# Patient Record
Sex: MALE | Race: WHITE | NOT HISPANIC OR LATINO
[De-identification: names, ages, dates, MRNs, and addresses within clinical notes are randomized per-mention and may not be internally consistent; named-entity substitution may affect disease eponyms.]

---

## 2017-04-04 ENCOUNTER — RX RENEWAL (OUTPATIENT)
Age: 82
End: 2017-04-04

## 2018-11-26 ENCOUNTER — INPATIENT (INPATIENT)
Facility: HOSPITAL | Age: 83
LOS: 2 days | Discharge: NURSING, REHAB OR LONG-TERM CARE | DRG: 640 | End: 2018-11-29
Attending: INTERNAL MEDICINE | Admitting: INTERNAL MEDICINE
Payer: MEDICARE

## 2018-11-26 VITALS
HEART RATE: 85 BPM | DIASTOLIC BLOOD PRESSURE: 85 MMHG | SYSTOLIC BLOOD PRESSURE: 158 MMHG | OXYGEN SATURATION: 96 % | RESPIRATION RATE: 18 BRPM | TEMPERATURE: 98 F

## 2018-11-26 DIAGNOSIS — R41.89 OTHER SYMPTOMS AND SIGNS INVOLVING COGNITIVE FUNCTIONS AND AWARENESS: ICD-10-CM

## 2018-11-26 DIAGNOSIS — Z95.1 PRESENCE OF AORTOCORONARY BYPASS GRAFT: Chronic | ICD-10-CM

## 2018-11-26 DIAGNOSIS — F03.90 UNSPECIFIED DEMENTIA WITHOUT BEHAVIORAL DISTURBANCE: ICD-10-CM

## 2018-11-26 DIAGNOSIS — I25.10 ATHEROSCLEROTIC HEART DISEASE OF NATIVE CORONARY ARTERY WITHOUT ANGINA PECTORIS: ICD-10-CM

## 2018-11-26 LAB
ALBUMIN SERPL ELPH-MCNC: 4.2 G/DL — SIGNIFICANT CHANGE UP (ref 3.3–5)
ALP SERPL-CCNC: 83 U/L — SIGNIFICANT CHANGE UP (ref 40–120)
ALT FLD-CCNC: 64 U/L — HIGH (ref 10–45)
ANION GAP SERPL CALC-SCNC: 21 MMOL/L — HIGH (ref 5–17)
APPEARANCE UR: CLEAR — SIGNIFICANT CHANGE UP
AST SERPL-CCNC: 209 U/L — HIGH (ref 10–40)
BACTERIA # UR AUTO: PRESENT /HPF
BASOPHILS NFR BLD AUTO: 0.1 % — SIGNIFICANT CHANGE UP (ref 0–2)
BILIRUB SERPL-MCNC: 1.1 MG/DL — SIGNIFICANT CHANGE UP (ref 0.2–1.2)
BILIRUB UR-MCNC: ABNORMAL
BUN SERPL-MCNC: 31 MG/DL — HIGH (ref 7–23)
CALCIUM SERPL-MCNC: 10.1 MG/DL — SIGNIFICANT CHANGE UP (ref 8.4–10.5)
CHLORIDE SERPL-SCNC: 99 MMOL/L — SIGNIFICANT CHANGE UP (ref 96–108)
CK MB CFR SERPL CALC: 32.2 NG/ML — HIGH (ref 0–6.7)
CK SERPL-CCNC: 3661 U/L — HIGH (ref 30–200)
CO2 SERPL-SCNC: 24 MMOL/L — SIGNIFICANT CHANGE UP (ref 22–31)
COLOR SPEC: YELLOW — SIGNIFICANT CHANGE UP
CREAT SERPL-MCNC: 0.8 MG/DL — SIGNIFICANT CHANGE UP (ref 0.5–1.3)
DIFF PNL FLD: ABNORMAL
EOSINOPHIL NFR BLD AUTO: 0.1 % — SIGNIFICANT CHANGE UP (ref 0–6)
EPI CELLS # UR: ABNORMAL /HPF (ref 0–5)
GLUCOSE SERPL-MCNC: 131 MG/DL — HIGH (ref 70–99)
GLUCOSE UR QL: NEGATIVE — SIGNIFICANT CHANGE UP
GRAN CASTS # UR COMP ASSIST: ABNORMAL /LPF
HCT VFR BLD CALC: 48.7 % — SIGNIFICANT CHANGE UP (ref 39–50)
HGB BLD-MCNC: 15.9 G/DL — SIGNIFICANT CHANGE UP (ref 13–17)
KETONES UR-MCNC: 40 MG/DL
LEUKOCYTE ESTERASE UR-ACNC: NEGATIVE — SIGNIFICANT CHANGE UP
LYMPHOCYTES # BLD AUTO: 5.9 % — LOW (ref 13–44)
MCHC RBC-ENTMCNC: 27.5 PG — SIGNIFICANT CHANGE UP (ref 27–34)
MCHC RBC-ENTMCNC: 32.6 G/DL — SIGNIFICANT CHANGE UP (ref 32–36)
MCV RBC AUTO: 84.3 FL — SIGNIFICANT CHANGE UP (ref 80–100)
MONOCYTES NFR BLD AUTO: 9.5 % — SIGNIFICANT CHANGE UP (ref 2–14)
NEUTROPHILS NFR BLD AUTO: 84.4 % — HIGH (ref 43–77)
NITRITE UR-MCNC: NEGATIVE — SIGNIFICANT CHANGE UP
PH UR: 6 — SIGNIFICANT CHANGE UP (ref 5–8)
PLATELET # BLD AUTO: 275 K/UL — SIGNIFICANT CHANGE UP (ref 150–400)
POTASSIUM SERPL-MCNC: 4.3 MMOL/L — SIGNIFICANT CHANGE UP (ref 3.5–5.3)
POTASSIUM SERPL-SCNC: 4.3 MMOL/L — SIGNIFICANT CHANGE UP (ref 3.5–5.3)
PROT SERPL-MCNC: 8.5 G/DL — HIGH (ref 6–8.3)
PROT UR-MCNC: 100 MG/DL
RBC # BLD: 5.78 M/UL — SIGNIFICANT CHANGE UP (ref 4.2–5.8)
RBC # FLD: 13.3 % — SIGNIFICANT CHANGE UP (ref 10.3–16.9)
RBC CASTS # UR COMP ASSIST: ABNORMAL /HPF
SODIUM SERPL-SCNC: 144 MMOL/L — SIGNIFICANT CHANGE UP (ref 135–145)
SP GR SPEC: >=1.03 — SIGNIFICANT CHANGE UP (ref 1–1.03)
TROPONIN T SERPL-MCNC: 0.02 NG/ML — HIGH (ref 0–0.01)
UROBILINOGEN FLD QL: 0.2 E.U./DL — SIGNIFICANT CHANGE UP
WBC # BLD: 15.5 K/UL — HIGH (ref 3.8–10.5)
WBC # FLD AUTO: 15.5 K/UL — HIGH (ref 3.8–10.5)
WBC UR QL: ABNORMAL /HPF

## 2018-11-26 PROCEDURE — 99285 EMERGENCY DEPT VISIT HI MDM: CPT | Mod: 25

## 2018-11-26 PROCEDURE — 93010 ELECTROCARDIOGRAM REPORT: CPT

## 2018-11-26 PROCEDURE — 73522 X-RAY EXAM HIPS BI 3-4 VIEWS: CPT | Mod: 26

## 2018-11-26 PROCEDURE — 71045 X-RAY EXAM CHEST 1 VIEW: CPT | Mod: 26

## 2018-11-26 PROCEDURE — 70450 CT HEAD/BRAIN W/O DYE: CPT | Mod: 26

## 2018-11-26 PROCEDURE — 72125 CT NECK SPINE W/O DYE: CPT | Mod: 26

## 2018-11-26 RX ORDER — SODIUM CHLORIDE 9 MG/ML
1000 INJECTION INTRAMUSCULAR; INTRAVENOUS; SUBCUTANEOUS ONCE
Qty: 0 | Refills: 0 | Status: COMPLETED | OUTPATIENT
Start: 2018-11-26 | End: 2018-11-26

## 2018-11-26 RX ORDER — HEPARIN SODIUM 5000 [USP'U]/ML
5000 INJECTION INTRAVENOUS; SUBCUTANEOUS EVERY 8 HOURS
Qty: 0 | Refills: 0 | Status: DISCONTINUED | OUTPATIENT
Start: 2018-11-26 | End: 2018-11-29

## 2018-11-26 RX ADMIN — SODIUM CHLORIDE 2000 MILLILITER(S): 9 INJECTION INTRAMUSCULAR; INTRAVENOUS; SUBCUTANEOUS at 15:04

## 2018-11-26 RX ADMIN — SODIUM CHLORIDE 200 MILLILITER(S): 9 INJECTION INTRAMUSCULAR; INTRAVENOUS; SUBCUTANEOUS at 20:30

## 2018-11-26 RX ADMIN — SODIUM CHLORIDE 1000 MILLILITER(S): 9 INJECTION INTRAMUSCULAR; INTRAVENOUS; SUBCUTANEOUS at 16:16

## 2018-11-26 RX ADMIN — Medication 1 MILLIGRAM(S): at 18:10

## 2018-11-26 RX ADMIN — SODIUM CHLORIDE 2000 MILLILITER(S): 9 INJECTION INTRAMUSCULAR; INTRAVENOUS; SUBCUTANEOUS at 16:38

## 2018-11-26 NOTE — ED ADULT TRIAGE NOTE - CHIEF COMPLAINT QUOTE
as per son "we haven't seen him since Friday night and today I went over there and found him on the floor."

## 2018-11-26 NOTE — H&P ADULT - PROBLEM SELECTOR PLAN 2
Trop 0.02, CK 3666  Received IV hydration  will repeat in AM  Possible CABG per his son, will obtain collateral from Dr Erica Espitia   Will also obtain list of medications

## 2018-11-26 NOTE — H&P ADULT - PMH
Bronchitis    CAD (coronary artery disease)    Dementia    Depression    Diverticulitis    Falls frequently    Hypertension    Osteopenia    Prostate cancer

## 2018-11-26 NOTE — ED ADULT NURSE NOTE - NSIMPLEMENTINTERV_GEN_ALL_ED
Implemented All Fall with Harm Risk Interventions:  [place] to call system. Call bell, personal items and telephone within reach. Instruct patient to call for assistance. Room bathroom lighting operational. Non-slip footwear when patient is off stretcher. Physically safe environment: no spills, clutter or unnecessary equipment. Stretcher in lowest position, wheels locked, appropriate side rails in place. Provide visual cue, wrist band, yellow gown, etc. Monitor gait and stability. Monitor for mental status changes and reorient to person, place, and time. Review medications for side effects contributing to fall risk. Reinforce activity limits and safety measures with patient and family. Provide visual clues: red socks.

## 2018-11-26 NOTE — H&P ADULT - NSHPLABSRESULTS_GEN_ALL_CORE
15.9   15.5  )-----------( 275      ( 26 Nov 2018 15:07 )             48.7       11-26    144  |  99  |  31<H>  ----------------------------<  131<H>  4.3   |  24  |  0.80    Ca    10.1      26 Nov 2018 15:07    TPro  8.5<H>  /  Alb  4.2  /  TBili  1.1  /  DBili  x   /  AST  209<H>  /  ALT  64<H>  /  AlkPhos  83  11-26          CARDIAC MARKERS ( 26 Nov 2018 15:07 )  x     / 0.02 ng/mL / 3661 U/L / x     / 32.2 ng/mL        Urinalysis Basic - ( 26 Nov 2018 16:50 )    Color: Yellow / Appearance: Clear / SG: >=1.030 / pH: x  Gluc: x / Ketone: 40 mg/dL  / Bili: Small / Urobili: 0.2 E.U./dL   Blood: x / Protein: 100 mg/dL / Nitrite: NEGATIVE   Leuk Esterase: NEGATIVE / RBC: 5-10 /HPF / WBC 5-10 /HPF   Sq Epi: x / Non Sq Epi: 5-10 /HPF / Bacteria: Present /HPF

## 2018-11-26 NOTE — H&P ADULT - NSHPPHYSICALEXAM_GEN_ALL_CORE
Temp 98.6F, HR 70bpm, /80, RR 16, O2 sat 98% RA, Weight 200lbs, Height 5' 6"    T(C): 36.4 (11-26-18 @ 22:33), Max: 36.7 (11-26-18 @ 15:39)  HR: 78 (11-26-18 @ 22:33) (78 - 95)  BP: 149/73 (11-26-18 @ 22:33) (146/82 - 176/87)  RR: 18 (11-26-18 @ 22:33) (18 - 18)  SpO2: 99% (11-26-18 @ 22:33) (96% - 99%)  Wt(kg): --    Appearance: Normal	  HEENT:   Normal oral mucosa, PERRL, EOMI	  Neck: Supple, - JVD; No Carotid Bruit and 2+ pulses B/L  Cardiovascular: Normal S1 S2, No JVD, No murmurs  Respiratory: Lungs clear to auscultation anteriorly	  Gastrointestinal:  Soft, Non-tender, + BS	  Skin: No rashes, No ecchymoses, No cyanosis  Extremities:  No clubbing, cyanosis or edema  Vascular: DP 1+ b/l, PT 1+ b/l  Neurologic: Non-focal  Psychiatry: Awake but no oriented

## 2018-11-26 NOTE — H&P ADULT - ASSESSMENT
85 y/o male with HTN, dementia, found on the floor at home from unknown length of time, dehydrated and confused, requiring sternal rub by EMS. CT head, chest unrevealing, CKMB 3666, troponin 0.02, WBC 15.5 but afebrile. Hr received 3L IVF and admitted

## 2018-11-26 NOTE — H&P ADULT - PROBLEM SELECTOR PLAN 1
Fall vs Syncope  Telemetry  PT to evaluate  Speech and swallow evaluation  Echocardiogram to evaluate wall motion

## 2018-11-26 NOTE — H&P ADULT - HISTORY OF PRESENT ILLNESS
This is a 86 y.o male with HTN, prostate cancer treated with seed implant, CAD ? CABG, GERD, diverticulitis, bronchitis, dementia on Donepezil This is a 86 y.o male with HTN, prostate cancer treated with seed implant, CAD ? CABG, GERD, diverticulitis, bronchitis, dementia on Donepezil. Pt lives alone, has multiples falls in the past year, BIBAwith AMS after fall vs syncope. Pt last seen functioning at baseline on Friday evening by son; On day of admission, son found pt on the floor next to bed and dresser, + AMS unclear for how long pt was on the floor, unclear precipitating events. Pt required sternal rub by EMS and in the ER for response. In the ER, he was found dehydrated, CKMB 3666, Troponin 0.02, elevated LFTs, BUN 31, WBC 15.5, afebrile, VSS. CT Head and CT spine negative for infarct/hemorrhage/fracture; CXR negative, Hip XRay negative. He received 3 Liters NS and became somewhat more alert then ripped off his IV line, became agitated, requiring Ativan 1mg and soft restraints to all 4 limbs  He is admitted for observation and possible safe discharge

## 2018-11-26 NOTE — ED PROVIDER NOTE - PROGRESS NOTE DETAILS
Pt hydrated in ED and awakened and agitated. Does not want to stay and be admitted. Ripped IV out. Confused and unsure about circumstances that brought him to the ED. Pt does not have capacity to make decisions and Ativan given for agitation and soft restraints applied. Labs noted. Ongoing plan of care discussed at length with family at bedside.

## 2018-11-26 NOTE — ED PROVIDER NOTE - MEDICAL DECISION MAKING DETAILS
87 yo M with hx of HTN, prostate ca, CAD, "pre-diabetes", bronchitis, GERD, diverticulitis, bronchitis, osteopenia, depression, dementia p/w BIBA with AMS after fall vs syncope. Pt last seen functioning at baseline on Friday evening by son; On day of admission, son found pt on the floor next to bed and dresser, + AMS unclear for how long pt was on the floor, unclear precipitating events. Pt responding only to sternal rub in ED. Son and family at bedside and state that pt with multiple falls in the past year and has declined home health aides and any help at home. Pt hydrated in ED and awakened and agitated. Does not want to stay and be admitted. Ripped IV out. Confused and unsure about circumstances that brought him to the ED. Pt does not have capacity to make decisions and Ativan given for agitation and soft restraints applied. Labs noted. Ongoing plan of care discussed at length with family at bedside. CK elevated and trop mildly elevated. CT Head/ c-spine with NAD. EKG noted. Pt admitted to cardiology for tele monitoring for possible syncope.

## 2018-11-26 NOTE — ED ADULT NURSE REASSESSMENT NOTE - NS ED NURSE REASSESS COMMENT FT1
pt became agitated & pulled IV out. Dr. Phillips made aware. soft restraints were applied to pt, pt's family made aware of plan of care. safety maintained. will continue to monitor.

## 2018-11-26 NOTE — H&P ADULT - PROBLEM SELECTOR PLAN 3
On Aricept at home  Required Ativan for agitation  Continue soft restraint for now  Psych consult in AM

## 2018-11-26 NOTE — ED ADULT NURSE NOTE - OBJECTIVE STATEMENT
Pt is a 86y/ male BIBA w/ c/o AMS. As per son, last time pt was seen was on Friday, today he found him lying on floor unresponsive. AAOx0 upon assessment, pt responded to vigorous stimuli. skin dry, flaky, redness blister-like wound noted to sacrum, also bruises noted to right arm, hip, and back. Pus/drainage noted around eyelids bilaterally. Attached to cardiac monitor.  As per son, pt is in early stages of dementia, recently lost his wife.

## 2018-11-26 NOTE — ED PROVIDER NOTE - OBJECTIVE STATEMENT
85 yo M with hx of HTN, prostate ca, CAD, "pre-diabetes", bronchitis, GERD, diverticulitis, bronchitis, osteopenia, depression, dementia p/w 85 yo M with hx of HTN, prostate ca, CAD, "pre-diabetes", bronchitis, GERD, diverticulitis, bronchitis, osteopenia, depression, dementia p/w BIBA with AMS after fall vs syncope. Pt last seen functioning at baseline on Friday evening by son; On day of admission, son found pt on the floor next to bed and dresser, + AMS unclear for how long pt was on the floor, unclear precipitating events. Pt responding only to sternal rub in ED. Son and family at bedside and state that pt with multiple falls in the past year and has declined home health aides and any help at home.

## 2018-11-27 DIAGNOSIS — R41.0 DISORIENTATION, UNSPECIFIED: ICD-10-CM

## 2018-11-27 LAB
ALBUMIN SERPL ELPH-MCNC: 3.4 G/DL — SIGNIFICANT CHANGE UP (ref 3.3–5)
ALP SERPL-CCNC: 66 U/L — SIGNIFICANT CHANGE UP (ref 40–120)
ALT FLD-CCNC: 51 U/L — HIGH (ref 10–45)
ANION GAP SERPL CALC-SCNC: 16 MMOL/L — SIGNIFICANT CHANGE UP (ref 5–17)
AST SERPL-CCNC: 123 U/L — HIGH (ref 10–40)
BILIRUB DIRECT SERPL-MCNC: <0.2 MG/DL — SIGNIFICANT CHANGE UP (ref 0–0.2)
BILIRUB INDIRECT FLD-MCNC: >0.7 MG/DL — SIGNIFICANT CHANGE UP (ref 0.2–1)
BILIRUB SERPL-MCNC: 0.9 MG/DL — SIGNIFICANT CHANGE UP (ref 0.2–1.2)
BUN SERPL-MCNC: 29 MG/DL — HIGH (ref 7–23)
CALCIUM SERPL-MCNC: 8.9 MG/DL — SIGNIFICANT CHANGE UP (ref 8.4–10.5)
CHLORIDE SERPL-SCNC: 106 MMOL/L — SIGNIFICANT CHANGE UP (ref 96–108)
CO2 SERPL-SCNC: 24 MMOL/L — SIGNIFICANT CHANGE UP (ref 22–31)
CREAT SERPL-MCNC: 0.69 MG/DL — SIGNIFICANT CHANGE UP (ref 0.5–1.3)
CULTURE RESULTS: NO GROWTH — SIGNIFICANT CHANGE UP
GLUCOSE SERPL-MCNC: 102 MG/DL — HIGH (ref 70–99)
HCT VFR BLD CALC: 44.4 % — SIGNIFICANT CHANGE UP (ref 39–50)
HCT VFR BLD CALC: 46.2 % — SIGNIFICANT CHANGE UP (ref 39–50)
HGB BLD-MCNC: 14.2 G/DL — SIGNIFICANT CHANGE UP (ref 13–17)
HGB BLD-MCNC: 14.7 G/DL — SIGNIFICANT CHANGE UP (ref 13–17)
MAGNESIUM SERPL-MCNC: 2.1 MG/DL — SIGNIFICANT CHANGE UP (ref 1.6–2.6)
MCHC RBC-ENTMCNC: 27.7 PG — SIGNIFICANT CHANGE UP (ref 27–34)
MCHC RBC-ENTMCNC: 28 PG — SIGNIFICANT CHANGE UP (ref 27–34)
MCHC RBC-ENTMCNC: 31.8 G/DL — LOW (ref 32–36)
MCHC RBC-ENTMCNC: 32 G/DL — SIGNIFICANT CHANGE UP (ref 32–36)
MCV RBC AUTO: 87.2 FL — SIGNIFICANT CHANGE UP (ref 80–100)
MCV RBC AUTO: 87.4 FL — SIGNIFICANT CHANGE UP (ref 80–100)
PLATELET # BLD AUTO: 210 K/UL — SIGNIFICANT CHANGE UP (ref 150–400)
PLATELET # BLD AUTO: 218 K/UL — SIGNIFICANT CHANGE UP (ref 150–400)
POTASSIUM SERPL-MCNC: 3.4 MMOL/L — LOW (ref 3.5–5.3)
POTASSIUM SERPL-SCNC: 3.4 MMOL/L — LOW (ref 3.5–5.3)
PROT SERPL-MCNC: 6.6 G/DL — SIGNIFICANT CHANGE UP (ref 6–8.3)
RBC # BLD: 5.08 M/UL — SIGNIFICANT CHANGE UP (ref 4.2–5.8)
RBC # BLD: 5.3 M/UL — SIGNIFICANT CHANGE UP (ref 4.2–5.8)
RBC # FLD: 13.4 % — SIGNIFICANT CHANGE UP (ref 10.3–16.9)
RBC # FLD: 13.4 % — SIGNIFICANT CHANGE UP (ref 10.3–16.9)
SODIUM SERPL-SCNC: 146 MMOL/L — HIGH (ref 135–145)
SPECIMEN SOURCE: SIGNIFICANT CHANGE UP
WBC # BLD: 11.2 K/UL — HIGH (ref 3.8–10.5)
WBC # BLD: 11.4 K/UL — HIGH (ref 3.8–10.5)
WBC # FLD AUTO: 11.2 K/UL — HIGH (ref 3.8–10.5)
WBC # FLD AUTO: 11.4 K/UL — HIGH (ref 3.8–10.5)

## 2018-11-27 PROCEDURE — 93306 TTE W/DOPPLER COMPLETE: CPT | Mod: 26

## 2018-11-27 PROCEDURE — 99223 1ST HOSP IP/OBS HIGH 75: CPT

## 2018-11-27 PROCEDURE — 74018 RADEX ABDOMEN 1 VIEW: CPT | Mod: 26

## 2018-11-27 PROCEDURE — 99233 SBSQ HOSP IP/OBS HIGH 50: CPT

## 2018-11-27 RX ORDER — CITALOPRAM 10 MG/1
10 TABLET, FILM COATED ORAL DAILY
Qty: 0 | Refills: 0 | Status: DISCONTINUED | OUTPATIENT
Start: 2018-11-27 | End: 2018-11-27

## 2018-11-27 RX ORDER — CITALOPRAM 10 MG/1
40 TABLET, FILM COATED ORAL DAILY
Qty: 0 | Refills: 0 | Status: DISCONTINUED | OUTPATIENT
Start: 2018-11-27 | End: 2018-11-29

## 2018-11-27 RX ORDER — ONDANSETRON 8 MG/1
4 TABLET, FILM COATED ORAL ONCE
Qty: 0 | Refills: 0 | Status: COMPLETED | OUTPATIENT
Start: 2018-11-27 | End: 2018-11-27

## 2018-11-27 RX ORDER — ATORVASTATIN CALCIUM 80 MG/1
40 TABLET, FILM COATED ORAL AT BEDTIME
Qty: 0 | Refills: 0 | Status: DISCONTINUED | OUTPATIENT
Start: 2018-11-27 | End: 2018-11-29

## 2018-11-27 RX ORDER — LANOLIN ALCOHOL/MO/W.PET/CERES
3 CREAM (GRAM) TOPICAL AT BEDTIME
Qty: 0 | Refills: 0 | Status: DISCONTINUED | OUTPATIENT
Start: 2018-11-27 | End: 2018-11-29

## 2018-11-27 RX ORDER — DONEPEZIL HYDROCHLORIDE 10 MG/1
10 TABLET, FILM COATED ORAL AT BEDTIME
Qty: 0 | Refills: 0 | Status: DISCONTINUED | OUTPATIENT
Start: 2018-11-27 | End: 2018-11-29

## 2018-11-27 RX ORDER — PANTOPRAZOLE SODIUM 20 MG/1
40 TABLET, DELAYED RELEASE ORAL
Qty: 0 | Refills: 0 | Status: DISCONTINUED | OUTPATIENT
Start: 2018-11-27 | End: 2018-11-28

## 2018-11-27 RX ORDER — POTASSIUM CHLORIDE 20 MEQ
40 PACKET (EA) ORAL ONCE
Qty: 0 | Refills: 0 | Status: COMPLETED | OUTPATIENT
Start: 2018-11-27 | End: 2018-11-27

## 2018-11-27 RX ORDER — SODIUM CHLORIDE 9 MG/ML
1000 INJECTION INTRAMUSCULAR; INTRAVENOUS; SUBCUTANEOUS
Qty: 0 | Refills: 0 | Status: DISCONTINUED | OUTPATIENT
Start: 2018-11-27 | End: 2018-11-28

## 2018-11-27 RX ORDER — ASPIRIN/CALCIUM CARB/MAGNESIUM 324 MG
81 TABLET ORAL DAILY
Qty: 0 | Refills: 0 | Status: DISCONTINUED | OUTPATIENT
Start: 2018-11-27 | End: 2018-11-29

## 2018-11-27 RX ORDER — PANTOPRAZOLE SODIUM 20 MG/1
40 TABLET, DELAYED RELEASE ORAL ONCE
Qty: 0 | Refills: 0 | Status: COMPLETED | OUTPATIENT
Start: 2018-11-27 | End: 2018-11-27

## 2018-11-27 RX ADMIN — ONDANSETRON 4 MILLIGRAM(S): 8 TABLET, FILM COATED ORAL at 21:30

## 2018-11-27 RX ADMIN — PANTOPRAZOLE SODIUM 40 MILLIGRAM(S): 20 TABLET, DELAYED RELEASE ORAL at 20:41

## 2018-11-27 RX ADMIN — Medication 3 MILLIGRAM(S): at 23:59

## 2018-11-27 RX ADMIN — SODIUM CHLORIDE 100 MILLILITER(S): 9 INJECTION INTRAMUSCULAR; INTRAVENOUS; SUBCUTANEOUS at 20:06

## 2018-11-27 RX ADMIN — DONEPEZIL HYDROCHLORIDE 10 MILLIGRAM(S): 10 TABLET, FILM COATED ORAL at 23:59

## 2018-11-27 RX ADMIN — CITALOPRAM 10 MILLIGRAM(S): 10 TABLET, FILM COATED ORAL at 14:03

## 2018-11-27 RX ADMIN — ATORVASTATIN CALCIUM 40 MILLIGRAM(S): 80 TABLET, FILM COATED ORAL at 23:59

## 2018-11-27 RX ADMIN — ONDANSETRON 4 MILLIGRAM(S): 8 TABLET, FILM COATED ORAL at 19:39

## 2018-11-27 RX ADMIN — Medication 40 MILLIEQUIVALENT(S): at 09:02

## 2018-11-27 RX ADMIN — HEPARIN SODIUM 5000 UNIT(S): 5000 INJECTION INTRAVENOUS; SUBCUTANEOUS at 14:02

## 2018-11-27 RX ADMIN — HEPARIN SODIUM 5000 UNIT(S): 5000 INJECTION INTRAVENOUS; SUBCUTANEOUS at 23:58

## 2018-11-27 NOTE — BEHAVIORAL HEALTH ASSESSMENT NOTE - NSBHCHARTREVIEWVS_PSY_A_CORE FT
Vital Signs Last 24 Hrs  T(C): 36.7 (27 Nov 2018 10:25), Max: 36.8 (27 Nov 2018 05:30)  T(F): 98 (27 Nov 2018 10:25), Max: 98.2 (27 Nov 2018 05:30)  HR: 78 (27 Nov 2018 10:25) (78 - 95)  BP: 149/72 (27 Nov 2018 10:25) (146/82 - 176/87)  BP(mean): --  RR: 18 (27 Nov 2018 10:25) (18 - 18)  SpO2: 93% (27 Nov 2018 10:25) (93% - 99%)

## 2018-11-27 NOTE — PROGRESS NOTE ADULT - PROBLEM SELECTOR PLAN 1
Fall vs Syncope  -continue to monitor on Telemetry  -evaluated by PT recommending MILA  -CT head negative, CT spine negative, Hip Xray negative   -Speech and swallow evaluated recommending mechanical soft and thin liquids  -Echo 11/27 with concentric LVH, abnormal (paradoxical) septal wall motion c/w abnormal conduction, EF 50-55%, no significant valvular disease.  -Collateral from [facility] Cardiologist Dr. Ugarte: Echo 2015 @ [facility] with abnormal septal wall motion c/w post CABG, Echo in office 2/2018 unchanged from 2015 Echo

## 2018-11-27 NOTE — BEHAVIORAL HEALTH ASSESSMENT NOTE - NSBHADMITCOUNSEL_PSY_A_CORE
importance of adherence to chosen treatment/diagnostic results/impressions and/or recommended studies

## 2018-11-27 NOTE — PHYSICAL THERAPY INITIAL EVALUATION ADULT - PERTINENT HX OF CURRENT PROBLEM, REHAB EVAL
85 yo M as per chart [facility] AMS after fall vs syncope. Pt last seen functioning at baseline on Friday evening by son; On day of admission, son found pt on the floor next to bed and dresser, + AMS unclear for how long pt was on the floor, unclear precipitating events. Pt required sternal rub by EMS and in the ER for response.

## 2018-11-27 NOTE — SWALLOW BEDSIDE ASSESSMENT ADULT - ADDITIONAL RECOMMENDATIONS
This SVC will f/u to monitor diet tolerance and provide additional counseling re safe eating/feeding strategies.

## 2018-11-27 NOTE — SWALLOW BEDSIDE ASSESSMENT ADULT - COMMENTS
86 y.o male with HTN, prostate cancer treated with seed implant, CAD ? CABG, GERD, diverticulitis, bronchitis, dementia on Donepezil. Pt lives alone, has multiples falls in the past year, BIBAwith AMS after fall vs syncope. Pt last seen functioning at baseline on Friday evening by son; On day of admission, son found pt on the floor next to bed and dresser, + AMS unclear for how long pt was on the floor, unclear precipitating events. Pt required sternal rub by EMS and in the ER for response. In the ER, he was found dehydrated, CKMB 3666, Troponin 0.02, elevated LFTs, BUN 31, WBC 15.5, afebrile, VSS. CT Head and CT spine negative for infarct/hemorrhage/fracture; CXR negative, Hip XRay negative. He received 3 Liters NS and became somewhat more alert then ripped off his IV line, became agitated, requiring Ativan 1mg and soft restraints to all 4 limbs  He is admitted for observation and possible safe discharge.

## 2018-11-27 NOTE — SWALLOW BEDSIDE ASSESSMENT ADULT - ASR SWALLOW ASPIRATION MONITOR
change of breathing pattern/cough/gurgly voice/upper respiratory infection/fever/pneumonia/throat clearing

## 2018-11-27 NOTE — SWALLOW BEDSIDE ASSESSMENT ADULT - PHARYNGEAL PHASE
laryngeal elevation palpated. No overt s/s of airway protection deficits were observed. Inconsistent double swallows per bolus across consistencies.

## 2018-11-27 NOTE — BEHAVIORAL HEALTH ASSESSMENT NOTE - NSBHCHARTREVIEWLAB_PSY_A_CORE FT
CBC Full  -  ( 27 Nov 2018 06:01 )  WBC Count : 11.2 K/uL  Hemoglobin : 14.2 g/dL  Hematocrit : 44.4 %  Platelet Count - Automated : 210 K/uL  Mean Cell Volume : 87.4 fL  Mean Cell Hemoglobin : 28.0 pg  Mean Cell Hemoglobin Concentration : 32.0 g/dL  Auto Neutrophil # : x  Auto Lymphocyte # : x  Auto Monocyte # : x  Auto Eosinophil # : x  Auto Basophil # : x  Auto Neutrophil % : x  Auto Lymphocyte % : x  Auto Monocyte % : x  Auto Eosinophil % : x  Auto Basophil % : x  11-27    146<H>  |  106  |  29<H>  ----------------------------<  102<H>  3.4<L>   |  24  |  0.69    Ca    8.9      27 Nov 2018 06:01  Mg     2.1     11-27    TPro  6.6  /  Alb  3.4  /  TBili  0.9  /  DBili  <0.2  /  AST  123<H>  /  ALT  51<H>  /  AlkPhos  66  11-27

## 2018-11-27 NOTE — BEHAVIORAL HEALTH ASSESSMENT NOTE - NSBHCHARTREVIEWIMAGING_PSY_A_CORE FT
< from: CT Head No Cont (11.26.18 @ 16:29) >    NTERPRETATION:  PROCEDURE: CT Head without contrast.    INDICATION: Altered mental status. Status post fall    TECHNIQUE: Multiple axial sections were obtained at 5 mm intervals. The   images were reviewed in brain and bone windows. Sagittal and coronal   reformations are provided.    COMPARISON: None    FINDINGS: The study is mildly limited by patient motion. Grossly, there   is no acute intracranial hemorrhage, mass effect or midline shift.   Gray-white matter differentiation appears preserved without evidence of   recent transcortical infarction.    There is mild to moderate diffuse parenchymal volume loss with   proportionate sulcal enlargement and ventriculomegaly. No rounding of the   temporal horns to imply acute hydrocephalus.    Gray to white matter differentiation appears preserved without evidence   of recent transcortical infarction injury. There is mild heterogeneous   lucency throughout the cerebral white matter that is most consistent with   small vessel ischemic change in a patient of this age.    Bone window images show no calvarial fracture. There is right   parieto-occipital scalp swelling. The paranasal sinuses and mastoid air   cells appear well ventilated. No acute orbital finding.    IMPRESSION:     No acute intracranial hemorrhage or calvarial fracture, within the mild   motion limitations of the study. Right parieto-occipital scalp injury.      < end of copied text >

## 2018-11-27 NOTE — SWALLOW BEDSIDE ASSESSMENT ADULT - SWALLOW EVAL: DIAGNOSIS
Pt p/w mild oropharyngeal dysphagia, characterized by decreased efficiency of oral manipulation of the bolus resulting in prolonged mastication and lingual residue, as well as suspected decreased strength of swallow resulting in multiple swallows. However, no overt s/s of airway protection deficits were observed. Pt would benefit from starting a modified diet w strict aspiration precautions.

## 2018-11-27 NOTE — SWALLOW BEDSIDE ASSESSMENT ADULT - SLP GENERAL OBSERVATIONS
Pt was received alert in semi-reclined bed, drinking thin liq by straw. Pt's son also present at bedside. Pt was oriented to self, place and year; but not to month/day.

## 2018-11-27 NOTE — PHYSICAL THERAPY INITIAL EVALUATION ADULT - ADDITIONAL COMMENTS
Pt lives at home alone though unable to give more history regarding home step up, elevator/stair? access. PTA: pt is ambulatory PTA though unable to provide details assistance/DME?

## 2018-11-27 NOTE — PROVIDER CONTACT NOTE (OTHER) - ACTION/TREATMENT ORDERED:
Zofran 4mg IV given, Protonix 4omg IV Abd XRay done. Contd to monitor BP, GI to see pt. Zofran 4mg IV given, Protonix 4omg IV Abd XRay done. Contd to monitor BP, CBC GI to see pt.Pt aslo started on Ns at 100 cchr.M

## 2018-11-27 NOTE — SWALLOW BEDSIDE ASSESSMENT ADULT - ORAL PHASE
Prolonged mastication w chewable solids. Lingual residue which was cleared w a cued liq wash. Pt required prompting to refrain from speaking while orally managing the bolus.

## 2018-11-27 NOTE — BEHAVIORAL HEALTH ASSESSMENT NOTE - SUMMARY
85 y/o male with HTN, dementia, found on the floor at home from unknown length of time, dehydrated and confused, requiring sternal rub by EMS. Patient was found to have CKMB 3666, troponin 0.02, WBC 15.5 but afebrile. Hr received 3L IVF and is currently in ER holding for admission. In the ED patient had an episode of agitation and received ativan 1mg. Patient currently presents with improvement in mental status but continues to have memory impairment and disorientation, likely secondary to delirium due to dehydration vs infection.  Plan:  -continue treatment of underlying medical causes of delirium  -avoid benzodiazepines/anticholinergics as they can worsen delirium and agitation  -if the patient is agitated can give haldol 1mg po/im q6h prn agitation, monitor EKG for Qtc prolongation  -start melatonin 3mg po qhs to target sleep-awake cycle reversal associated with delirium

## 2018-11-27 NOTE — PROGRESS NOTE ADULT - SUBJECTIVE AND OBJECTIVE BOX
Interventional Cardiology PA Adult Progress Note    Subjective Assessment: Pt seen and examined at bedside this AM. Pt endorses feeling thirsty. No other c/o at this time. Pt unable to recall reason for admission. A & O x1 (self)    12 Point review of systems otherwise negative except for subjective HPI   	  MEDICATIONS:        citalopram 40 milliGRAM(s) Oral daily  donepezil 10 milliGRAM(s) Oral at bedtime      atorvastatin 40 milliGRAM(s) Oral at bedtime    aspirin enteric coated 81 milliGRAM(s) Oral daily  heparin  Injectable 5000 Unit(s) SubCutaneous every 8 hours      	    [PHYSICAL EXAM:  TELEMETRY:  T(C): 36.7 (11-27-18 @ 14:29), Max: 36.8 (11-27-18 @ 05:30)  HR: 98 (11-27-18 @ 17:15) (78 - 98)  BP: 144/68 (11-27-18 @ 17:15) (132/72 - 156/77)  RR: 18 (11-27-18 @ 17:15) (18 - 18)  SpO2: 95% (11-27-18 @ 17:15) (93% - 99%)  Wt(kg): --  I&O's Summary    26 Nov 2018 07:01  -  27 Nov 2018 07:00  --------------------------------------------------------  IN: 0 mL / OUT: 300 mL / NET: -300 mL        Armijo:  Central/PICC/Mid Line:                                         Appearance: Normal	  HEENT:   Normal oral mucosa, PERRL, EOMI	  Neck: Supple, - JVD  Cardiovascular: Normal S1 S2, No JVD, No murmurs, well healed mid-sternal surgical scar    Respiratory: Lungs clear to auscultation, No Rales, Rhonchi, Wheezing	  Gastrointestinal:  Soft, Non-tender, + BS	  Skin: No rashes, No ecchymoses, No cyanosis  Extremities: Normal range of motion, No clubbing, cyanosis or edema  Vascular: Peripheral pulses palpable 2+ bilaterally  Neurologic: Non-focal  Psychiatry: A & O x 1 (Self), Mood & affect appropriate      	    ECG:  	  RADIOLOGY:   DIAGNOSTIC TESTING:  [ ] Echocardiogram:  [ ]  Catheterization:  [ ] Stress Test:    [ ] ANGELES  OTHER: 	    LABS:	 	  CARDIAC MARKERS:                                  14.2   11.2  )-----------( 210      ( 27 Nov 2018 06:01 )             44.4     11-27    146<H>  |  106  |  29<H>  ----------------------------<  102<H>  3.4<L>   |  24  |  0.69    Ca    8.9      27 Nov 2018 06:01  Mg     2.1     11-27    TPro  6.6  /  Alb  3.4  /  TBili  0.9  /  DBili  <0.2  /  AST  123<H>  /  ALT  51<H>  /  AlkPhos  66  11-27    proBNP:   Lipid Profile:   HgA1c:   TSH:       ASSESSMENT/PLAN: 	        DVT ppx:  Dispo:

## 2018-11-27 NOTE — SWALLOW BEDSIDE ASSESSMENT ADULT - SWALLOW EVAL: RECOMMENDED FEEDING/EATING TECHNIQUES
alternate food with liquid/small sips/bites/Clean mouth after all meals prior to reclining the pt in bed to rest/position upright (90 degrees)/allow for swallow between intakes

## 2018-11-27 NOTE — BEHAVIORAL HEALTH ASSESSMENT NOTE - HPI (INCLUDE ILLNESS QUALITY, SEVERITY, DURATION, TIMING, CONTEXT, MODIFYING FACTORS, ASSOCIATED SIGNS AND SYMPTOMS)
87 y/o male with HTN, dementia, found on the floor at home from unknown length of time, dehydrated and confused, requiring sternal rub by EMS. Patient was found to have CKMB 3666, troponin 0.02, WBC 15.5 but afebrile. Hr received 3L IVF and is currently in ER holding for admission. In the ED patient had an episode of agitation and received ativan 1mg. Upon interview patient reports that he cannot recall what brought him to the hospital. He admits that he has been having confusion and memory problems during the last 2 weeks. He is AAOx2 (for date says December 19th 2018). Patient reports recent history of depression vs bereavement after his wife passed away this year. Patient saw in the recent past Dr. José Rosales (psychotherapist). As per patient's son, patient's current presentation with confusion and agitation is not consistent with his baseline. At baseline, patient lives alone and takes care of himself. Patient's son is requesting that the patient is referred back to psychiatric treatment to address his grieving. 85 y/o male with HTN, dementia, found on the floor at home from unknown length of time, dehydrated and confused, requiring sternal rub by EMS. Patient was found to have CKMB 3666, troponin 0.02, WBC 15.5 but afebrile. Hr received 3L IVF and is currently in ER holding for admission. In the ED patient had an episode of agitation and received ativan 1mg. Upon interview patient reports that he cannot recall what brought him to the hospital. He admits that he has been having confusion and memory problems during the last 2 weeks. He is AAOx2 (for date says December 19th 2018). Patient reports recent history of depression vs bereavement after his wife passed away this year. Patient saw in the recent past Dr. José Rosales (psychotherapist). As per patient's son, patient's current presentation with confusion and agitation is not consistent with his baseline. At baseline, patient lives alone and takes care of himself. Patient's son is requesting that the patient is referred back to psychiatric treatment to address his grieving. Patient is currently prescribed citalopram for depression (as per son 40mg daily, chart states 10mg daily- to be clarified)

## 2018-11-27 NOTE — PROGRESS NOTE ADULT - ASSESSMENT
85 y/o male with HTN, HLD, CAD with prior 4 V CABG, dementia (A& O to self, on aricept), found on the floor at home from unknown length of time, dehydrated and confused, requiring sternal rub by EMS. CT head, chest unrevealing, CKMB 3666, troponin 0.02, WBC 15.5 on admit but afebrile. S/p 3L NS and admitted to 5 [facility] telemetry for fall vs syncope work up

## 2018-11-27 NOTE — SWALLOW BEDSIDE ASSESSMENT ADULT - SWALLOW EVAL: FEEDING ASSISTANCE
Prompt pt to refrain from speaking while orally managing PO. Limit distractions. Position pt upright for all Po intake./frequent cues/help required

## 2018-11-28 DIAGNOSIS — K92.0 HEMATEMESIS: ICD-10-CM

## 2018-11-28 LAB
ALBUMIN SERPL ELPH-MCNC: 3.1 G/DL — LOW (ref 3.3–5)
ALP SERPL-CCNC: 61 U/L — SIGNIFICANT CHANGE UP (ref 40–120)
ALT FLD-CCNC: 44 U/L — SIGNIFICANT CHANGE UP (ref 10–45)
ANION GAP SERPL CALC-SCNC: 13 MMOL/L — SIGNIFICANT CHANGE UP (ref 5–17)
AST SERPL-CCNC: 71 U/L — HIGH (ref 10–40)
BILIRUB DIRECT SERPL-MCNC: <0.2 MG/DL — SIGNIFICANT CHANGE UP (ref 0–0.2)
BILIRUB INDIRECT FLD-MCNC: >0.5 MG/DL — SIGNIFICANT CHANGE UP (ref 0.2–1)
BILIRUB SERPL-MCNC: 0.7 MG/DL — SIGNIFICANT CHANGE UP (ref 0.2–1.2)
BUN SERPL-MCNC: 29 MG/DL — HIGH (ref 7–23)
CALCIUM SERPL-MCNC: 8.5 MG/DL — SIGNIFICANT CHANGE UP (ref 8.4–10.5)
CHLORIDE SERPL-SCNC: 103 MMOL/L — SIGNIFICANT CHANGE UP (ref 96–108)
CO2 SERPL-SCNC: 25 MMOL/L — SIGNIFICANT CHANGE UP (ref 22–31)
CREAT SERPL-MCNC: 0.67 MG/DL — SIGNIFICANT CHANGE UP (ref 0.5–1.3)
GLUCOSE SERPL-MCNC: 127 MG/DL — HIGH (ref 70–99)
HBA1C BLD-MCNC: 5.4 % — SIGNIFICANT CHANGE UP (ref 4–5.6)
HCT VFR BLD CALC: 41.8 % — SIGNIFICANT CHANGE UP (ref 39–50)
HGB BLD-MCNC: 13.3 G/DL — SIGNIFICANT CHANGE UP (ref 13–17)
MAGNESIUM SERPL-MCNC: 1.9 MG/DL — SIGNIFICANT CHANGE UP (ref 1.6–2.6)
MCHC RBC-ENTMCNC: 27.8 PG — SIGNIFICANT CHANGE UP (ref 27–34)
MCHC RBC-ENTMCNC: 31.8 G/DL — LOW (ref 32–36)
MCV RBC AUTO: 87.3 FL — SIGNIFICANT CHANGE UP (ref 80–100)
PLATELET # BLD AUTO: 212 K/UL — SIGNIFICANT CHANGE UP (ref 150–400)
POTASSIUM SERPL-MCNC: 3.5 MMOL/L — SIGNIFICANT CHANGE UP (ref 3.5–5.3)
POTASSIUM SERPL-SCNC: 3.5 MMOL/L — SIGNIFICANT CHANGE UP (ref 3.5–5.3)
PROT SERPL-MCNC: 6 G/DL — SIGNIFICANT CHANGE UP (ref 6–8.3)
RBC # BLD: 4.79 M/UL — SIGNIFICANT CHANGE UP (ref 4.2–5.8)
RBC # FLD: 13.3 % — SIGNIFICANT CHANGE UP (ref 10.3–16.9)
SODIUM SERPL-SCNC: 141 MMOL/L — SIGNIFICANT CHANGE UP (ref 135–145)
WBC # BLD: 11.1 K/UL — HIGH (ref 3.8–10.5)
WBC # FLD AUTO: 11.1 K/UL — HIGH (ref 3.8–10.5)

## 2018-11-28 PROCEDURE — 93010 ELECTROCARDIOGRAM REPORT: CPT

## 2018-11-28 PROCEDURE — 99222 1ST HOSP IP/OBS MODERATE 55: CPT | Mod: GC

## 2018-11-28 PROCEDURE — 99233 SBSQ HOSP IP/OBS HIGH 50: CPT

## 2018-11-28 PROCEDURE — 99232 SBSQ HOSP IP/OBS MODERATE 35: CPT

## 2018-11-28 RX ORDER — PANTOPRAZOLE SODIUM 20 MG/1
40 TABLET, DELAYED RELEASE ORAL
Qty: 0 | Refills: 0 | Status: DISCONTINUED | OUTPATIENT
Start: 2018-11-28 | End: 2018-11-29

## 2018-11-28 RX ORDER — SODIUM CHLORIDE 9 MG/ML
500 INJECTION INTRAMUSCULAR; INTRAVENOUS; SUBCUTANEOUS
Qty: 0 | Refills: 0 | Status: DISCONTINUED | OUTPATIENT
Start: 2018-11-28 | End: 2018-11-29

## 2018-11-28 RX ORDER — POTASSIUM CHLORIDE 20 MEQ
40 PACKET (EA) ORAL ONCE
Qty: 0 | Refills: 0 | Status: COMPLETED | OUTPATIENT
Start: 2018-11-28 | End: 2018-11-29

## 2018-11-28 RX ADMIN — PANTOPRAZOLE SODIUM 40 MILLIGRAM(S): 20 TABLET, DELAYED RELEASE ORAL at 06:56

## 2018-11-28 RX ADMIN — Medication 3 MILLIGRAM(S): at 23:14

## 2018-11-28 RX ADMIN — DONEPEZIL HYDROCHLORIDE 10 MILLIGRAM(S): 10 TABLET, FILM COATED ORAL at 23:13

## 2018-11-28 RX ADMIN — Medication 81 MILLIGRAM(S): at 18:15

## 2018-11-28 RX ADMIN — PANTOPRAZOLE SODIUM 40 MILLIGRAM(S): 20 TABLET, DELAYED RELEASE ORAL at 18:15

## 2018-11-28 RX ADMIN — CITALOPRAM 40 MILLIGRAM(S): 10 TABLET, FILM COATED ORAL at 18:15

## 2018-11-28 RX ADMIN — ATORVASTATIN CALCIUM 40 MILLIGRAM(S): 80 TABLET, FILM COATED ORAL at 23:13

## 2018-11-28 RX ADMIN — SODIUM CHLORIDE 100 MILLILITER(S): 9 INJECTION INTRAMUSCULAR; INTRAVENOUS; SUBCUTANEOUS at 15:49

## 2018-11-28 NOTE — PROGRESS NOTE ADULT - PROBLEM SELECTOR PLAN 3
Continued home aricept 10 mg daily and citalopram 40 mg daily   -Required Ativan  1mg x1 and soft restraints for agitation in ED, restraints removed in ED  -Psych consulted and recommending avoid benzodiazepines/anticholinergics as they can worsen delirium and agitation  -if the patient is agitated can give haldol 1mg po/im q6h prn agitation, monitor EKG for Qtc prolongation  -start melatonin 3mg po qhs to target sleep-awake cycle reversal associated with delirium.     DVT ppx: Subcutaneous heparin   Dispo: PT recommending MILA, discuss w/ Son preferences
Continued home aricept 10 mg daily and citalopram 40 mg daily   -Required Ativan  1mg x1 and soft restraints for agitation in ED, restraints removed in ED  -Psych consulted and recommending avoid benzodiazepines/anticholinergics as they can worsen delirium and agitation  -if the patient is agitated can give haldol 1mg po/im q6h prn agitation, monitor EKG for Qtc prolongation  -start melatonin 3mg po qhs to target sleep-awake cycle reversal associated with delirium.

## 2018-11-28 NOTE — PROGRESS NOTE ADULT - SUBJECTIVE AND OBJECTIVE BOX
Interventional Cardiology PA Adult Progress Note    CC: unwitnessed fall vs syncope  Subjective Assessment: Pt seen and examined at bedside this AM. Pt denies any dizziness, nausea, GERD symptoms or further episodes of emesis today. Pt endorses having a bowel movement yesterday evening and denied any blood in stool.     12 point review of systems otherwise negative except for subjective HPI  	  MEDICATIONS:        citalopram 40 milliGRAM(s) Oral daily  donepezil 10 milliGRAM(s) Oral at bedtime  melatonin 3 milliGRAM(s) Oral at bedtime    pantoprazole    Tablet 40 milliGRAM(s) Oral two times a day    atorvastatin 40 milliGRAM(s) Oral at bedtime    aspirin enteric coated 81 milliGRAM(s) Oral daily  heparin  Injectable 5000 Unit(s) SubCutaneous every 8 hours  potassium chloride   Powder 40 milliEquivalent(s) Oral once  sodium chloride 0.9%. 1000 milliLiter(s) IV Continuous <Continuous>      	    [PHYSICAL EXAM:  TELEMETRY:  T(C): 37.1 (11-28-18 @ 13:40), Max: 37.1 (11-28-18 @ 06:04)  HR: 84 (11-28-18 @ 09:00) (84 - 105)  BP: 121/64 (11-28-18 @ 09:00) (119/66 - 200/89)  RR: 18 (11-28-18 @ 09:00) (16 - 18)  SpO2: 97% (11-28-18 @ 06:54) (94% - 97%)  Wt(kg): --  I&O's Summary    27 Nov 2018 07:01  -  28 Nov 2018 07:00  --------------------------------------------------------  IN: 0 mL / OUT: 100 mL / NET: -100 mL      Height (cm): 180.34 (11-27 @ 17:15)  Weight (kg): 68.3 (11-27 @ 17:15)  BMI (kg/m2): 21 (11-27 @ 17:15)  BSA (m2): 1.87 (11-27 @ 17:15)  Armijo:  Central/PICC/Mid Line:                                         Appearance: Normal	  HEENT:   Normal oral mucosa, PERRL, EOMI	  Neck: Supple, + JVD, Carotid Bruit   Cardiovascular: Normal S1 S2, No JVD, No murmurs, well healed mid sternal surgical scar   Respiratory: Lungs clear to auscultation,  No Rales, Rhonchi, Wheezing	  Gastrointestinal:  Soft, Non-tender, + BS	  Skin: No rashes, No ecchymoses, No cyanosis  Extremities: Normal range of motion, No clubbing, cyanosis or edema  Vascular: Peripheral pulses palpable 2+ bilaterally  Neurologic: Non-focal  Psychiatry: A & O x 1 (self), Mood & affect appropriate      	    ECG:  	  RADIOLOGY:   DIAGNOSTIC TESTING:  [ ] Echocardiogram:  [ ]  Catheterization:  [ ] Stress Test:    [ ] ANGELES  OTHER: 	    LABS:	 	  CARDIAC MARKERS:                                  13.3   11.1  )-----------( 212      ( 28 Nov 2018 06:35 )             41.8     11-28    141  |  103  |  29<H>  ----------------------------<  127<H>  3.5   |  25  |  0.67    Ca    8.5      28 Nov 2018 06:36  Mg     1.9     11-28    TPro  6.0  /  Alb  3.1<L>  /  TBili  0.7  /  DBili  <0.2  /  AST  71<H>  /  ALT  44  /  AlkPhos  61  11-28    proBNP:   Lipid Profile:   HgA1c: Hemoglobin A1C, Whole Blood: 5.4 % (11-28 @ 06:35)    TSH:       ASSESSMENT/PLAN: 	        DVT ppx:  Dispo:

## 2018-11-28 NOTE — CONSULT NOTE ADULT - ATTENDING COMMENTS
Pt seen and examined, case d/w fellow -- Dr. Casas.  Pt with hx of dementia now with two episodes of hematemesis with a stable Hgb.  Continue daily PPI, clear liquids and advance diet.

## 2018-11-28 NOTE — PROGRESS NOTE ADULT - ATTENDING COMMENTS
Reviewed PA documentation. Reviewed vitals, labs, medications, cardiac studies and imaging 11/28/18. Agree with the above with the following additions/amendments:  86 WM with Demential A&Ox1 (self), Essential HTN, HLD, CAD s/p 4 V CABG, who was admitted s/p fall, with dehydration and AMS. Patient improved s/p 3L NS.  Patient with multiple episodes coffee ground emesis overnight.  GI consulted. No urgent indication for endoscopy. Patient with agitation intermittently, likely 2/2 disorientation as pt out of normal home environment and with metabolic derangement.    PO PPI daily  Clear liquid diet to start, ADAT per GI  Physical therapy evaluation, rec'd [facility]  Psychiatry consultation given dementia with agitation  Cont ASA, Atorva 40 for known ASCVD  GIM consulted for transfer to Medicine [facility]  Family considering [facility] locations for placement    [facility]  Cardiology attending

## 2018-11-28 NOTE — CONSULT NOTE ADULT - ATTENDING COMMENTS
Consulted for transfer to Medicine.  Syncope work up is negative, falls probably due to underlying borderline dementia, dehydration plus recent use of nyquil for insomnia. Toxic metabolic encephalopathy has resolved.  Dehydrated on exam, rec to give 500cc bolus and advance diet per Swallow recs. Needs assistance with feedings.  F/up BMP as with sligh azotemia.  C/w PPI for GERD  Avoid benzos for agitation, prefer haldol prn and to f/up QTc.  Proceed with dc planning, d/w SW. Patient can remain under Cards and we will continue to follow up.  Rest as above

## 2018-11-28 NOTE — CONSULT NOTE ADULT - ASSESSMENT
85 yo Male with PMHx HTN, prostate cancer treated with seed implant, CAD s/p CABG, GERD, diverticulitis, bronchitis, and dementia who was BIBA for AMS after fall vs. syncope.    #Fall: likely multifactorial 2/2 dehydration, use of Nyquil for sleep, and dementia. No events on tele, ECHO without valvular disease.-CT head negative, CT spine negative, Hip Xray negative. s/p 3L in ED, was on maintenance at 100cc/hr but has been discontinued. Pt appears dry on exam, did not eat his lunch today.  - would recommend giving 500cc NS bolus  - please advance diet to mechanical soft/thin liquids per S&S recommendations    #Dementia: Mental status appears improved with rehydration and holding off Nyquil  - c/w Donepezil  - avoid benzodiazepines/anticholinergics as they can worsen delirium and agitation  - give Haldol PRN for agitation    #Hematemesis: no further episodes, H&H stable, ealuated by GI; possible gastritis, esophagitis   - c/w PPI  - if hematemesis recurs despite PPI, possible EGD per GI    PPx: HSQ  Dispo: pending [facility] placement    Medicine will continue to follow  Discussed with attending

## 2018-11-28 NOTE — PROGRESS NOTE ADULT - PROBLEM SELECTOR PLAN 1
Fall vs Syncope  -continue to monitor on Telemetry  -evaluated by PT recommending [facility]  -CT head negative, CT spine negative, Hip Xray negative   -Speech and swallow evaluated recommending mechanical soft and thin liquids  -Echo 11/27 with concentric LVH, abnormal (paradoxical) septal wall motion c/w abnormal conduction, EF 50-55%, no significant valvular disease.  -Collateral from [facility] Cardiologist Dr. Ugarte: Echo 2015 @ [facility] with abnormal septal wall motion c/w post CABG, Echo in office 2/2018 unchanged from 2015 Echo Fall vs Syncope  -Received IV hydration 3L NS in ED   -continue to monitor on Telemetry  -evaluated by PT recommending [facility]  -CT head negative, CT spine negative, Hip Xray negative   -Speech and swallow evaluated recommending mechanical soft and thin liquids  -Echo 11/27 with concentric LVH, abnormal (paradoxical) septal wall motion c/w abnormal conduction, EF 50-55%, no significant valvular disease.  -Collateral from [facility] Cardiologist Dr. Ugarte: Echo 2015 @ [facility] with abnormal septal wall motion c/w post CABG, Echo in office 2/2018 unchanged from 2015 Echo

## 2018-11-28 NOTE — PROGRESS NOTE ADULT - ASSESSMENT
85 y/o male with HTN, HLD, CAD with prior 4 V CABG, dementia (A& O to self, on aricept), found on the floor at home from unknown length of time, dehydrated and confused, requiring sternal rub by EMS. CT head, chest unrevealing, CKMB 3666, troponin 0.02, WBC 15.5 on admit but afebrile. S/p 3L NS and admitted to 5 [facility] telemetry for fall vs syncope work up.

## 2018-11-28 NOTE — PROGRESS NOTE ADULT - PROBLEM SELECTOR PLAN 4
Pt with 3 episodes of coffee-ground emesis yesterday evening with nausea. No further complaints of nausea or episodes of emesis today   -pt given zofran 4 mg IV X1, protonix 40 mg IV x1, 1L NS @ 100 cc yesterday   -Hgb 13.3, stable   -abdominal X ray without obstruction  -GI consulted and ddx includes gastritis, esophagitis, PUD, pennie tierney   - PPI BID x 4weeks then resume daily dosing (patient has dementia and lives alone and was probably not taking his medications)  - trend as appropriate  - monitor for further episodes of hematemesis  - given patients age, stable Hgb, and no further episodes of hematemesis at this time - there is no emergent indication for an EGD at this time, as less likely endoscopy will lead to an intervention  - GI will consider an EGD if his bleeding reoccurs or persists despite PPI   - restarted diet with clears and advance as tolerated    DVT ppx: subcut heparin   Dispo: Pt awaiting [facility] placement, medicine consulted for transfer given no further cardiac work up at this time

## 2018-11-28 NOTE — CONSULT NOTE ADULT - SUBJECTIVE AND OBJECTIVE BOX
HPI:  86yr old M with PMHx significant for GERD, Diverticulosis/Diverticulitis, HTN, CAD sp ?CABG, Bronchitis, Dementia admitted for evaluation of possible syncope. GI consulted for evaluation of coffee ground emesis.  Patient is a poor historian due to his dementia  Patient was found down in his home by his son, after an undetermined period of time, and unable to get up, BIBEMS for further evaluation. Last nite patient was noted to have 2 episodes of 20cc of coffee ground emesis, and was started on IV PPI and kept NPO for further evaluation. Patient unable to recall events of last nite. Per nurse he has not had anymore episodes of emesis. He denies nausea, diarrhea, abdominal pain, fever, chills, sick contacts, recent travel, leg swelling.    EGD - unable to recall  Colonoscopy - daughter states he had one prior to 80yrs which she feels was negative      PAST MEDICAL & SURGICAL HISTORY:  Falls frequently  Hypertension  Prostate cancer  Depression  Osteopenia  Bronchitis  Diverticulitis  CAD (coronary artery disease)  Dementia  S/P CABG (coronary artery bypass graft)      REVIEW OF SYSTEMS  Apart from items noted in the HPI a 10point ROS was negative      MEDICATIONS  (STANDING):  aspirin enteric coated 81 milliGRAM(s) Oral daily  atorvastatin 40 milliGRAM(s) Oral at bedtime  citalopram 40 milliGRAM(s) Oral daily  donepezil 10 milliGRAM(s) Oral at bedtime  heparin  Injectable 5000 Unit(s) SubCutaneous every 8 hours  melatonin 3 milliGRAM(s) Oral at bedtime  pantoprazole    Tablet 40 milliGRAM(s) Oral before breakfast  potassium chloride   Powder 40 milliEquivalent(s) Oral once  sodium chloride 0.9%. 1000 milliLiter(s) (100 mL/Hr) IV Continuous <Continuous>    MEDICATIONS  (PRN):      Allergies  No Known Allergies    Intolerances      SOCIAL HISTORY:  Patient denies toxic or illicit habits    FAMILY HISTORY:  FHx of colon cancer in both parents  Denies FHx of stomach/pancreatic cancer, IBD or liver issues    Vital Signs Last 24 Hrs  T(C): 36.7 (28 Nov 2018 08:51), Max: 37.1 (28 Nov 2018 06:04)  T(F): 98.1 (28 Nov 2018 08:51), Max: 98.7 (28 Nov 2018 06:04)  HR: 88 (28 Nov 2018 06:54) (78 - 105)  BP: 119/66 (28 Nov 2018 06:54) (119/66 - 200/89)  BP(mean): --  RR: 18 (28 Nov 2018 06:54) (16 - 18)  SpO2: 97% (28 Nov 2018 06:54) (93% - 97%)    PHYSICAL EXAM:  GEN: elderly M lying in bed in no distress, anicteric afebrile, not pale  Respiratory: CTA  Cardiovascular: s1 s2 no M RRR  Gastrointestinal: full, soft, BS+, NT, NR, NG, no masses or organs palpated  Rectal: deferred  Extremities: no limb edema  Neurological: AAOx1-2, non focal  Skin: intact      LABS:                     13.3   11.1  )-----------( 212      ( 28 Nov 2018 06:35 )             41.8     141  |  103  |  29<H>  ----------------------------<  127<H>  3.5   |  25  |  0.67    Ca    8.5      28 Nov 2018 06:36  Mg     1.9     11-28    TPro  6.0  /  Alb  3.1<L>  /  TBili  0.7  /  DBili  <0.2  /  AST  71<H>  /  ALT  44  /  AlkPhos  61  11-28    Urinalysis Basic - ( 26 Nov 2018 16:50 )  Color: Yellow / Appearance: Clear / SG: >=1.030 / pH: x  Gluc: x / Ketone: 40 mg/dL  / Bili: Small / Urobili: 0.2 E.U./dL   Blood: x / Protein: 100 mg/dL / Nitrite: NEGATIVE   Leuk Esterase: NEGATIVE / RBC: 5-10 /HPF / WBC 5-10 /HPF   Sq Epi: x / Non Sq Epi: 5-10 /HPF / Bacteria: Present /HPF          RADIOLOGY & ADDITIONAL STUDIES:

## 2018-11-28 NOTE — PROGRESS NOTE ADULT - PROBLEM SELECTOR PLAN 2
Trop 0.02, CK 3666  Received IV hydration 3L NS in ED   -4VCAD in 2010 per Dr. Espitia (PCP)  -continue aspirin 81 mg and atorvastatin 40 mg daily Pt remains CP free, Trop 0.02, CK 3666  -4VCAD in 2010 per Dr. Espitia (PCP)  -continue aspirin 81 mg and atorvastatin 40 mg daily

## 2018-11-28 NOTE — CONSULT NOTE ADULT - ASSESSMENT
86yr old M with PMHx significant for GERD, Diverticulosis/Diverticulitis, HTN, CAD sp ?CABG, Bronchitis, Dementia admitted for evaluation of possible syncope. GI consulted for evaluation of coffee ground emesis.    # Hematemesis -  - ddx - gastritis, esophagitis, PUD, pennie tierney   - PPI BID [id number] then resume daily dosing (patient has dementia and lives alone and was probably not taking his medications)  - Hgb stable  - trend as appropriate  - monitor for further episodes of hematemesis  - given patients age, stable Hgb, and no further episodes of hematemesis at this time - there is no emergent indication for an EGD at this time, as less likely endoscopy will lead to an intervention  - we can consider an EGD if his bleeding reoccurs or persists despite PPI   - may restart diet - with clears and advance as tolerated      Discussed with attending Dr Harper  GI will sign off for now please reconsult us as needed

## 2018-11-29 ENCOUNTER — TRANSCRIPTION ENCOUNTER (OUTPATIENT)
Age: 83
End: 2018-11-29

## 2018-11-29 VITALS — TEMPERATURE: 99 F

## 2018-11-29 LAB
ANION GAP SERPL CALC-SCNC: 8 MMOL/L — SIGNIFICANT CHANGE UP (ref 5–17)
BUN SERPL-MCNC: 21 MG/DL — SIGNIFICANT CHANGE UP (ref 7–23)
CALCIUM SERPL-MCNC: 8.5 MG/DL — SIGNIFICANT CHANGE UP (ref 8.4–10.5)
CHLORIDE SERPL-SCNC: 107 MMOL/L — SIGNIFICANT CHANGE UP (ref 96–108)
CO2 SERPL-SCNC: 26 MMOL/L — SIGNIFICANT CHANGE UP (ref 22–31)
CREAT SERPL-MCNC: 0.64 MG/DL — SIGNIFICANT CHANGE UP (ref 0.5–1.3)
GLUCOSE SERPL-MCNC: 112 MG/DL — HIGH (ref 70–99)
HCT VFR BLD CALC: 40.7 % — SIGNIFICANT CHANGE UP (ref 39–50)
HGB BLD-MCNC: 12.4 G/DL — LOW (ref 13–17)
MAGNESIUM SERPL-MCNC: 1.9 MG/DL — SIGNIFICANT CHANGE UP (ref 1.6–2.6)
MCHC RBC-ENTMCNC: 27.1 PG — SIGNIFICANT CHANGE UP (ref 27–34)
MCHC RBC-ENTMCNC: 30.5 G/DL — LOW (ref 32–36)
MCV RBC AUTO: 88.9 FL — SIGNIFICANT CHANGE UP (ref 80–100)
PLATELET # BLD AUTO: 171 K/UL — SIGNIFICANT CHANGE UP (ref 150–400)
POTASSIUM SERPL-MCNC: 3.6 MMOL/L — SIGNIFICANT CHANGE UP (ref 3.5–5.3)
POTASSIUM SERPL-SCNC: 3.6 MMOL/L — SIGNIFICANT CHANGE UP (ref 3.5–5.3)
RBC # BLD: 4.58 M/UL — SIGNIFICANT CHANGE UP (ref 4.2–5.8)
RBC # FLD: 13.3 % — SIGNIFICANT CHANGE UP (ref 10.3–16.9)
SODIUM SERPL-SCNC: 141 MMOL/L — SIGNIFICANT CHANGE UP (ref 135–145)
WBC # BLD: 9.5 K/UL — SIGNIFICANT CHANGE UP (ref 3.8–10.5)
WBC # FLD AUTO: 9.5 K/UL — SIGNIFICANT CHANGE UP (ref 3.8–10.5)

## 2018-11-29 PROCEDURE — 96360 HYDRATION IV INFUSION INIT: CPT

## 2018-11-29 PROCEDURE — 72125 CT NECK SPINE W/O DYE: CPT

## 2018-11-29 PROCEDURE — 80048 BASIC METABOLIC PNL TOTAL CA: CPT

## 2018-11-29 PROCEDURE — 87040 BLOOD CULTURE FOR BACTERIA: CPT

## 2018-11-29 PROCEDURE — 83735 ASSAY OF MAGNESIUM: CPT

## 2018-11-29 PROCEDURE — 87086 URINE CULTURE/COLONY COUNT: CPT

## 2018-11-29 PROCEDURE — 85025 COMPLETE CBC W/AUTO DIFF WBC: CPT

## 2018-11-29 PROCEDURE — 84484 ASSAY OF TROPONIN QUANT: CPT

## 2018-11-29 PROCEDURE — 74018 RADEX ABDOMEN 1 VIEW: CPT

## 2018-11-29 PROCEDURE — 80076 HEPATIC FUNCTION PANEL: CPT

## 2018-11-29 PROCEDURE — 81001 URINALYSIS AUTO W/SCOPE: CPT

## 2018-11-29 PROCEDURE — 85027 COMPLETE CBC AUTOMATED: CPT

## 2018-11-29 PROCEDURE — 93005 ELECTROCARDIOGRAM TRACING: CPT

## 2018-11-29 PROCEDURE — 99233 SBSQ HOSP IP/OBS HIGH 50: CPT | Mod: GC

## 2018-11-29 PROCEDURE — 99238 HOSP IP/OBS DSCHRG MGMT 30/<: CPT

## 2018-11-29 PROCEDURE — 73522 X-RAY EXAM HIPS BI 3-4 VIEWS: CPT

## 2018-11-29 PROCEDURE — 83036 HEMOGLOBIN GLYCOSYLATED A1C: CPT

## 2018-11-29 PROCEDURE — 99285 EMERGENCY DEPT VISIT HI MDM: CPT | Mod: 25

## 2018-11-29 PROCEDURE — 82962 GLUCOSE BLOOD TEST: CPT

## 2018-11-29 PROCEDURE — 93306 TTE W/DOPPLER COMPLETE: CPT

## 2018-11-29 PROCEDURE — 92610 EVALUATE SWALLOWING FUNCTION: CPT

## 2018-11-29 PROCEDURE — 82550 ASSAY OF CK (CPK): CPT

## 2018-11-29 PROCEDURE — 36415 COLL VENOUS BLD VENIPUNCTURE: CPT

## 2018-11-29 PROCEDURE — 71045 X-RAY EXAM CHEST 1 VIEW: CPT

## 2018-11-29 PROCEDURE — 99231 SBSQ HOSP IP/OBS SF/LOW 25: CPT

## 2018-11-29 PROCEDURE — 82553 CREATINE MB FRACTION: CPT

## 2018-11-29 PROCEDURE — 70450 CT HEAD/BRAIN W/O DYE: CPT

## 2018-11-29 PROCEDURE — 92526 ORAL FUNCTION THERAPY: CPT

## 2018-11-29 PROCEDURE — 80053 COMPREHEN METABOLIC PANEL: CPT

## 2018-11-29 PROCEDURE — 92612 ENDOSCOPY SWALLOW (FEES) VID: CPT

## 2018-11-29 RX ORDER — LANOLIN ALCOHOL/MO/W.PET/CERES
1 CREAM (GRAM) TOPICAL
Qty: 0 | Refills: 0 | DISCHARGE
Start: 2018-11-29

## 2018-11-29 RX ORDER — PANTOPRAZOLE SODIUM 20 MG/1
1 TABLET, DELAYED RELEASE ORAL
Qty: 0 | Refills: 0 | DISCHARGE
Start: 2018-11-29

## 2018-11-29 RX ORDER — PANTOPRAZOLE SODIUM 20 MG/1
1 TABLET, DELAYED RELEASE ORAL
Qty: 0 | Refills: 0 | COMMUNITY

## 2018-11-29 RX ORDER — POTASSIUM CHLORIDE 20 MEQ
40 PACKET (EA) ORAL ONCE
Qty: 0 | Refills: 0 | Status: COMPLETED | OUTPATIENT
Start: 2018-11-29 | End: 2018-11-29

## 2018-11-29 RX ADMIN — Medication 81 MILLIGRAM(S): at 17:00

## 2018-11-29 RX ADMIN — Medication 40 MILLIEQUIVALENT(S): at 05:56

## 2018-11-29 RX ADMIN — PANTOPRAZOLE SODIUM 40 MILLIGRAM(S): 20 TABLET, DELAYED RELEASE ORAL at 05:39

## 2018-11-29 RX ADMIN — CITALOPRAM 40 MILLIGRAM(S): 10 TABLET, FILM COATED ORAL at 17:00

## 2018-11-29 RX ADMIN — Medication 40 MILLIEQUIVALENT(S): at 17:00

## 2018-11-29 RX ADMIN — ATORVASTATIN CALCIUM 40 MILLIGRAM(S): 80 TABLET, FILM COATED ORAL at 17:00

## 2018-11-29 RX ADMIN — PANTOPRAZOLE SODIUM 40 MILLIGRAM(S): 20 TABLET, DELAYED RELEASE ORAL at 17:00

## 2018-11-29 NOTE — PROGRESS NOTE ADULT - SUBJECTIVE AND OBJECTIVE BOX
INTERVAL HPI/OVERNIGHT EVENTS:    OVERNIGHT: No overnight events.  SUBJECTIVE: Patient seen and examined at bedside.     ROS:  CV: Denies chest pain  Resp: Denies SOB  GI: Denies abdominal pain, constipation, diarrhea, nausea, vomiting  : Denies dysuria  ID: Denies fevers, chills  MSK: Denies joint pain     OBJECTIVE:    VITAL SIGNS:  ICU Vital Signs Last 24 Hrs  T(C): 36.6 (29 Nov 2018 05:05), Max: 37.2 (28 Nov 2018 22:02)  T(F): 97.8 (29 Nov 2018 05:05), Max: 98.9 (28 Nov 2018 22:02)  HR: 88 (29 Nov 2018 05:40) (72 - 88)  BP: 131/72 (29 Nov 2018 05:40) (113/59 - 131/72)  BP(mean): --  ABP: --  ABP(mean): --  RR: 18 (29 Nov 2018 05:40) (18 - 18)  SpO2: 98% (29 Nov 2018 05:40) (98% - 98%)        11-28 @ 07:01  -  11-29 @ 07:00  --------------------------------------------------------  IN: 750 mL / OUT: 0 mL / NET: 750 mL      CAPILLARY BLOOD GLUCOSE          PHYSICAL EXAM:    General: NAD, comfortable  HEENT: NCAT, PERRL, clear conjunctiva, no scleral icterus  Neck: supple, no JVD  Respiratory: CTA b/l, no wheezing, rhonchi, rales  Cardiovascular: RRR, normal S1S2, no M/R/G  Abdomen: soft, NT/ND, bowel sounds in all four quadrants, no palpable masses  Extremities: WWP, no clubbing, cyanosis, or edema  Neuro: AAO,3, answers questions appropriately    MEDICATIONS:  MEDICATIONS  (STANDING):  aspirin enteric coated 81 milliGRAM(s) Oral daily  atorvastatin 40 milliGRAM(s) Oral at bedtime  citalopram 40 milliGRAM(s) Oral daily  donepezil 10 milliGRAM(s) Oral at bedtime  heparin  Injectable 5000 Unit(s) SubCutaneous every 8 hours  melatonin 3 milliGRAM(s) Oral at bedtime  pantoprazole    Tablet 40 milliGRAM(s) Oral two times a day  potassium chloride   Powder 40 milliEquivalent(s) Oral once  sodium chloride 0.9%. 500 milliLiter(s) (100 mL/Hr) IV Continuous <Continuous>    MEDICATIONS  (PRN):      ALLERGIES:  Allergies    No Known Allergies    Intolerances        LABS:                        12.4   9.5   )-----------( 171      ( 29 Nov 2018 05:39 )             40.7     11-29    141  |  107  |  21  ----------------------------<  112<H>  3.6   |  26  |  0.64    Ca    8.5      29 Nov 2018 05:39  Mg     1.9     11-29    TPro  6.0  /  Alb  3.1<L>  /  TBili  0.7  /  DBili  <0.2  /  AST  71<H>  /  ALT  44  /  AlkPhos  61  11-28          RADIOLOGY & ADDITIONAL TESTS: Reviewed. INTERVAL HPI/OVERNIGHT EVENTS:    OVERNIGHT: No overnight events.  SUBJECTIVE: Patient seen and examined at bedside.     ROS:  CV: Denies chest pain  Resp: Denies SOB  GI: Denies abdominal pain, constipation, diarrhea, nausea, vomiting  : Denies dysuria  ID: Denies fevers, chills  MSK: Denies joint pain     OBJECTIVE:    VITAL SIGNS:  ICU Vital Signs Last 24 Hrs  T(C): 36.6 (29 Nov 2018 05:05), Max: 37.2 (28 Nov 2018 22:02)  T(F): 97.8 (29 Nov 2018 05:05), Max: 98.9 (28 Nov 2018 22:02)  HR: 88 (29 Nov 2018 05:40) (72 - 88)  BP: 131/72 (29 Nov 2018 05:40) (113/59 - 131/72)  BP(mean): --  ABP: --  ABP(mean): --  RR: 18 (29 Nov 2018 05:40) (18 - 18)  SpO2: 98% (29 Nov 2018 05:40) (98% - 98%)        11-28 @ 07:01  -  11-29 @ 07:00  --------------------------------------------------------  IN: 750 mL / OUT: 0 mL / NET: 750 mL      CAPILLARY BLOOD GLUCOSE          PHYSICAL EXAM:    GENERAL: NAD, well-groomed, well-developed  EYES: EOMI, PERRLA, conjunctiva and sclera clear  ENMT: No tonsillar erythema, exudates, or enlargement; moist mucous membrane  NECK: Supple, No JVD  CV: Regular rate and rhythm; No murmurs, rubs, or gallops  NERVOUS SYSTEM:  AAOx3, answers questions appropriately, moving all extremities  PULM: Clear to percussion bilaterally; No rales, rhonchi, wheezing, or rubs  ABDOMEN: Soft, Nontender, Nondistended; Bowel sounds present  EXTREMITIES:  2+ Peripheral Pulses, No clubbing, cyanosis, or edema    MEDICATIONS:  MEDICATIONS  (STANDING):  aspirin enteric coated 81 milliGRAM(s) Oral daily  atorvastatin 40 milliGRAM(s) Oral at bedtime  citalopram 40 milliGRAM(s) Oral daily  donepezil 10 milliGRAM(s) Oral at bedtime  heparin  Injectable 5000 Unit(s) SubCutaneous every 8 hours  melatonin 3 milliGRAM(s) Oral at bedtime  pantoprazole    Tablet 40 milliGRAM(s) Oral two times a day  potassium chloride   Powder 40 milliEquivalent(s) Oral once  sodium chloride 0.9%. 500 milliLiter(s) (100 mL/Hr) IV Continuous <Continuous>    MEDICATIONS  (PRN):      ALLERGIES:  Allergies    No Known Allergies    Intolerances        LABS:                        12.4   9.5   )-----------( 171      ( 29 Nov 2018 05:39 )             40.7     11-29    141  |  107  |  21  ----------------------------<  112<H>  3.6   |  26  |  0.64    Ca    8.5      29 Nov 2018 05:39  Mg     1.9     11-29    TPro  6.0  /  Alb  3.1<L>  /  TBili  0.7  /  DBili  <0.2  /  AST  71<H>  /  ALT  44  /  AlkPhos  61  11-28          RADIOLOGY & ADDITIONAL TESTS: Reviewed.

## 2018-11-29 NOTE — PROGRESS NOTE ADULT - ASSESSMENT
87 yo Male with PMHx HTN, prostate cancer treated with seed implant, CAD s/p CABG, GERD, diverticulitis, bronchitis, and dementia who was BIBA for AMS after fall vs. syncope. 87 yo Male with PMHx HTN, prostate cancer treated with seed implant, CAD s/p CABG, GERD, diverticulitis, bronchitis, and dementia who was BIBA for AMS after fall vs. syncope.    #Fall: likely multifactorial 2/2 dehydration, use of Nyquil for sleep, and dementia. No events on tele, ECHO without valvular disease.-CT head negative, CT spine negative, Hip Xray negative. s/p 3L in ED, was on maintenance at 100cc/hr, received additional 500cc NS bolus yesterday.  - Diet advanced to mechanical soft/nectar thick liquids per S&S recommendations    #Dementia: Mental status appears improved with rehydration and holding off Nyquil  - c/w Donepezil  - avoid benzodiazepines/anticholinergics as they can worsen delirium and agitation  - give Haldol PRN for agitation    #Hematemesis: no further episodes, H&H stable, ealuated by GI; possible gastritis, esophagitis   - c/w PPI  - if hematemesis recurs despite PPI, possible EGD per GI    PPx: HSQ  Dispo: pending [facility] placement    Medicine will continue to follow  Discussed with attending

## 2018-11-29 NOTE — DISCHARGE NOTE ADULT - CARE PROVIDER_API CALL
Roger Ugarte  Phone: [phone]  Fax: (   )    -    Erica Espitia  [address] [place] [address]  Phone: [phone]  Fax: (   )    - Erica Espitia  [address] [place] [address]  Phone: [phone]  Fax: (   )    -    Roger Ugarte (Cardiology)  [address] [place] [address]  Phone: [phone]  Fax: (   )    -

## 2018-11-29 NOTE — SWALLOW FEES ASSESSMENT ADULT - COMMENTS
B/L TVF movement noted. No pooled secretions observed. 86 y.o male with HTN, prostate cancer treated with seed implant, CAD ? CABG, GERD, diverticulitis, bronchitis, dementia on Donepezil. Pt lives alone, has multiples falls in the past year, BIBAwith AMS after fall vs syncope. Pt last seen functioning at baseline on Friday evening by son; On day of admission, son found pt on the floor next to bed and dresser, + AMS unclear for how long pt was on the floor, unclear precipitating events. Pt required sternal rub by EMS and in the ER for response. In the ER, he was found dehydrated, CKMB 3666, Troponin 0.02, elevated LFTs, BUN 31, WBC 15.5, afebrile, VSS. CT Head and CT spine negative for infarct/hemorrhage/fracture; CXR negative, Hip XRay negative.    Based on clinical presentation, a FEES was recommended and an order was provided by the medical team. The risks, benefits and alternatives of this study were discussed with the pt/family. They expressed understanding and agreed to proceed. The pt tolerated the passing and presence of the scope without difficulty. Pt was observed to have difficulty w self-administration of small cup sips, but refused hand-over-hand guidance.

## 2018-11-29 NOTE — SWALLOW FEES ASSESSMENT ADULT - SPECIFY REASON(S)
To assess for safest, least restrictive PO diet given clinical observation of inconsistent cough w thin liquids

## 2018-11-29 NOTE — DISCHARGE NOTE ADULT - INSTRUCTIONS
Mechanical soft solids and nectar-thick liquids.  Alternate food with liquid; maintain upright posture during/after eating for 30 mins; oral hygiene; position upright (90 degrees); small sips/bites; Clean oral cavity after meals.  Prompt pt for small bites/sips and to alternate liquids w solids.  Aspiration precautions, assess for change of breathing pattern; cough; gurgly voice; fever; pneumonia; throat clearing; upper respiratory infection.   1) Consider allowing small sips of thin liq after oral care has been provided, in between meals, despite risk of asp, to decrease risk of dehydration (given pt reports of disliking NTL) and for pleasure.   2) Pt would benefit from dysphagia therapy in a rehab setting to target improving strength of BOT retraction and pharyngeal squeeze, to work towards diet advancement to thin liquids.   3) Consider Occupational Therapy evaluation given pt's family reports of pt's difficulty w fine motor tasks such as buttoning his shirt and placing his hearing aids.

## 2018-11-29 NOTE — SWALLOW FEES ASSESSMENT ADULT - NS SWALLOW FEES REC ASPIR MON
fever/gurgly voice/throat clearing/change of breathing pattern/cough/pneumonia/upper respiratory infection

## 2018-11-29 NOTE — PROGRESS NOTE BEHAVIORAL HEALTH - NSBHADMITCOUNSEL_PSY_A_CORE
diagnostic results/impressions and/or recommended studies/importance of adherence to chosen treatment
risks and benefits of treatment options/risk factor reduction/diagnostic results/impressions and/or recommended studies/importance of adherence to chosen treatment

## 2018-11-29 NOTE — PROGRESS NOTE BEHAVIORAL HEALTH - NSBHFUPINTERVALHXFT_PSY_A_CORE
Patient seen and interviewed at bedside. He reports that he has been feeling depressed in context of his current medical admission. He still cannot recall what happened prior to coming to the hospital. He is not able to give the correct date.
Patient seen and interviewed at bedside. Patient's daughter also provided information related to the event leading to patient's admission. According to the daughter, after she left to [place] after her mother's memorial, he remained alone in his apartment where he broke his furniture and defecated on the floor in context of becoming very confused. As per patient's daughter patient has been taking at home nyquil and other over the counter sleeping medications, which could have contributed to this episode of delirium,   Today, the patient presents calm and cooperative. He is able to tell that he is in the hospital and almost the correct date (11/29/18). He cannot recall the events leading to this admission except that he was talking to his wife (hallucinations in context of delirium ?). He denies any SI/HI. He has been sleeping well and

## 2018-11-29 NOTE — DISCHARGE NOTE ADULT - PLAN OF CARE
Mechanical soft solids and nectar-thick liquids.  Alternate food with liquid; maintain upright posture during/after eating for 30 mins; oral hygiene; position upright (90 degrees); small sips/bites; Clean oral cavity after meals.  Prompt pt for small bites/sips and to alternate liquids w solids.  Aspiration precautions, assess for change of breathing pattern; cough; gurgly voice; fever; pneumonia; throat clearing; upper respiratory infection.   1) Consider allowing small sips of thin liq after oral care has been provided, in between meals, despite risk of asp, to decrease risk of dehydration (given pt reports of disliking NTL) and for pleasure.   2) Pt would benefit from dysphagia therapy in a rehab setting to target improving strength of BOT retraction and pharyngeal squeeze, to work towards diet advancement to thin liquids.   3) Consider Occupational Therapy evaluation given pt's family reports of pt's difficulty w fine motor tasks such as buttoning his shirt and placing his hearing aids. Physical therapy recommended you for subacute rehabilitation You were admitted for an unwitnessed fall. The CT scan of the head and spine were negative for acute fractures. The bilateral hip fractures was negative for fracture. You were found to be dehydrated and were hydrated with intravenous fluids. Please continue aspirin 81 mg daily and atorvastatin 40 mg daily You had an echocardiogram during your hospital course that normal pumping function of the heart and unchanged from your last echocardiogram performed at your outpatient cardiologist Dr. Ugarte's office. Please continue aricept 10 mg at bedtime, citalopram 40 mg daily, and melatonin 3 mg at bedtime Please continue pantoprazole 40 mg twice daily until December 19, 2018 and then you can return to pantoprazole 40 mg daily dose. During your admission you had 3 episodes of nausea with vomiting. You were evaluated by the gastroenterologist service who suspected a possible peptic ulcer or inflammation of the stomach as the cause and suggested increasing your home pantoprazole dose to 40 mg twice daily for a total of 4 weeks. Please discontinue the twice daily dosing after the evening dose on December 19, 2018 and then resume pantoprazole 40 mg daily. Continue aspiration precautions and the following speech and swallow recommendations. Physical therapy recommended you for subacute rehabilitation. Please follow up with your PCP Dr. Espitia in 1-2 weeks.. Please continue aspirin 81 mg daily and atorvastatin 40 mg daily.

## 2018-11-29 NOTE — DISCHARGE NOTE ADULT - PROVIDER TOKENS
FREE:[LAST:[Torito],FIRST:[Roger],PHONE:[phone]],FAX:[(   )    -]],FREE:[LAST:[Omkar],FIRST:[Erica],PHONE:[phone]:[(   )    -],ADDRESS:[[address] [place] [address]]] FREE:[LAST:[Omkar],FIRST:[Erica],PHONE:[phone]],FAX:[(   )    -],ADDRESS:[[address] [place] [address]]],FREE:[LAST:[Torito],FIRST:[Roger (Cardiology)],PHONE:[phone]],FAX:[(   )    -],ADDRESS:[[address] [place] [id number]]]

## 2018-11-29 NOTE — DISCHARGE NOTE ADULT - CARE PLAN
Principal Discharge DX:	Fall, initial encounter  Goal:	Physical therapy recommended you for subacute rehabilitation  Assessment and plan of treatment:	You were admitted for an unwitnessed fall. The CT scan of the head and spine were negative for acute fractures. The bilateral hip fractures was negative for fracture. You were found to be dehydrated and were hydrated with intravenous fluids.  Secondary Diagnosis:	CAD (coronary artery disease)  Goal:	Please continue aspirin 81 mg daily and atorvastatin 40 mg daily  Assessment and plan of treatment:	You had an echocardiogram during your hospital course that normal pumping function of the heart and unchanged from your last echocardiogram performed at your outpatient cardiologist Dr. Ugarte's office.  Secondary Diagnosis:	Dementia  Goal:	Please continue aricept 10 mg at bedtime, citalopram 40 mg daily, and melatonin 3 mg at bedtime  Secondary Diagnosis:	Hematemesis with nausea  Goal:	Please continue pantoprazole 40 mg twice daily until December 19, 2018 and then you can return to pantoprazole 40 mg daily dose.  Assessment and plan of treatment:	During your admission you had 3 episodes of nausea with vomiting. You were evaluated by the gastroenterologist service who suspected a possible peptic ulcer or inflammation of the stomach as the cause and suggested increasing your home pantoprazole dose to 40 mg twice daily for a total of 4 weeks. Please discontinue the twice daily dosing after the evening dose on December 19, 2018 and then resume pantoprazole 40 mg daily.  Secondary Diagnosis:	Dysphagia  Goal:	Continue aspiration precautions and the following speech and swallow recommendations.  Assessment and plan of treatment:	Mechanical soft solids and nectar-thick liquids.  Alternate food with liquid; maintain upright posture during/after eating for 30 mins; oral hygiene; position upright (90 degrees); small sips/bites; Clean oral cavity after meals.  Prompt pt for small bites/sips and to alternate liquids w solids.  Aspiration precautions, assess for change of breathing pattern; cough; gurgly voice; fever; pneumonia; throat clearing; upper respiratory infection.   1) Consider allowing small sips of thin liq after oral care has been provided, in between meals, despite risk of asp, to decrease risk of dehydration (given pt reports of disliking NTL) and for pleasure.   2) Pt would benefit from dysphagia therapy in a rehab setting to target improving strength of BOT retraction and pharyngeal squeeze, to work towards diet advancement to thin liquids.   3) Consider Occupational Therapy evaluation given pt's family reports of pt's difficulty w fine motor tasks such as buttoning his shirt and placing his hearing aids. Principal Discharge DX:	Fall, initial encounter  Goal:	Physical therapy recommended you for subacute rehabilitation. Please follow up with your PCP Dr. Espitia in 1-2 weeks..  Assessment and plan of treatment:	You were admitted for an unwitnessed fall. The CT scan of the head and spine were negative for acute fractures. The bilateral hip fractures was negative for fracture. You were found to be dehydrated and were hydrated with intravenous fluids.  Secondary Diagnosis:	CAD (coronary artery disease)  Goal:	Please continue aspirin 81 mg daily and atorvastatin 40 mg daily.  Assessment and plan of treatment:	You had an echocardiogram during your hospital course that normal pumping function of the heart and unchanged from your last echocardiogram performed at your outpatient cardiologist Dr. Ugarte's office.  Secondary Diagnosis:	Dementia  Goal:	Please continue aricept 10 mg at bedtime, citalopram 40 mg daily, and melatonin 3 mg at bedtime  Secondary Diagnosis:	Hematemesis with nausea  Goal:	Please continue pantoprazole 40 mg twice daily until December 19, 2018 and then you can return to pantoprazole 40 mg daily dose.  Assessment and plan of treatment:	During your admission you had 3 episodes of nausea with vomiting. You were evaluated by the gastroenterologist service who suspected a possible peptic ulcer or inflammation of the stomach as the cause and suggested increasing your home pantoprazole dose to 40 mg twice daily for a total of 4 weeks. Please discontinue the twice daily dosing after the evening dose on December 19, 2018 and then resume pantoprazole 40 mg daily.  Secondary Diagnosis:	Dysphagia  Goal:	Continue aspiration precautions and the following speech and swallow recommendations.  Assessment and plan of treatment:	Mechanical soft solids and nectar-thick liquids.  Alternate food with liquid; maintain upright posture during/after eating for 30 mins; oral hygiene; position upright (90 degrees); small sips/bites; Clean oral cavity after meals.  Prompt pt for small bites/sips and to alternate liquids w solids.  Aspiration precautions, assess for change of breathing pattern; cough; gurgly voice; fever; pneumonia; throat clearing; upper respiratory infection.   1) Consider allowing small sips of thin liq after oral care has been provided, in between meals, despite risk of asp, to decrease risk of dehydration (given pt reports of disliking NTL) and for pleasure.   2) Pt would benefit from dysphagia therapy in a rehab setting to target improving strength of BOT retraction and pharyngeal squeeze, to work towards diet advancement to thin liquids.   3) Consider Occupational Therapy evaluation given pt's family reports of pt's difficulty w fine motor tasks such as buttoning his shirt and placing his hearing aids.

## 2018-11-29 NOTE — DISCHARGE NOTE ADULT - MEDICATION SUMMARY - MEDICATIONS TO TAKE
I will START or STAY ON the medications listed below when I get home from the hospital:    Aspirin Enteric Coated 81 mg oral delayed release tablet  -- 1 tab(s) by mouth once a day  -- Indication: For Coronary artery disease    citalopram 40 mg oral tablet  -- 1 tab(s) by mouth once a day  -- Indication: For Depression    atorvastatin 40 mg oral tablet  -- 1 tab(s) by mouth once a day  -- Indication: For Cholesterol, CAD    donepezil 10 mg oral tablet  -- 1 tab(s) by mouth once a day (at bedtime)  -- Indication: For Dementia    potassium chloride 10 mEq oral tablet, extended release  -- 1 tab(s) by mouth 2 times a day  -- Indication: For Supplement, kidney stone prevention     melatonin 3 mg oral tablet  -- 1 tab(s) by mouth once a day (at bedtime)  -- Indication: For insomnia    pantoprazole 40 mg oral delayed release tablet  -- 1 tab(s) by mouth 2 times a day  -- Indication: For Acid reflux, LAST DOSE OF TWICE DAY DOSING 12/19/18 AFTER EVENING DOSE, THEN RETURNS TO Pantoprazole 40 mg Daily

## 2018-11-29 NOTE — SWALLOW FEES ASSESSMENT ADULT - DIAGNOSTIC IMPRESSIONS
Pt p/w mild oral and moderate pharyngeal dysphagia. Oral stage significant for bolus holding w liquids. Pharyngeal stage was characterized by decreased coordination, decreased sensation and decreased strength, resulting in (1) audible aspiration of large sips of thin liquids prior to swallow initiation; (2) audible penetration of thin liquids after the initial swallow (cannot r/o trace aspiration); and (3) post-deglutitive residue which pt was not able to clear without cueing. Given these observations, it is recommended that the pt remain on his current modified diet w strict aspiration precautions as indicated below, especially to alternate liquids and solids.

## 2018-11-29 NOTE — PROGRESS NOTE BEHAVIORAL HEALTH - NSBHFUPINTERVALCCFT_PSY_A_CORE
No episodes of agitation since yesterday.
Since his admission, patient has been calm and cooperative, without any episodes of agitation.

## 2018-11-29 NOTE — PROGRESS NOTE BEHAVIORAL HEALTH - SUMMARY
85 y/o male with HTN, dementia, found on the floor at home from unknown length of time, dehydrated and confused, requiring sternal rub by EMS. Patient was found to have CKMB 3666, troponin 0.02, WBC 15.5 but afebrile. Hr received 3L IVF and is currently in ER holding for admission. In the ED patient had an episode of agitation and received ativan 1mg. Patient currently presents with improvement in mental status but continues to have memory impairment and disorientation, likely secondary to delirium due to dehydration vs infection.  Plan:  -continue treatment of underlying medical causes of delirium  -avoid benzodiazepines/anticholinergics as they can worsen delirium and agitation  -if the patient is agitated can give haldol 1mg po/im q6h prn agitation, monitor EKG for Qtc prolongation  -continue melatonin 3mg po qhs to target sleep-awake cycle reversal associated with delirium  -can also give trazodone 50mg po qhs prn insomnia
87 y/o male with HTN, dementia, found on the floor at home from unknown length of time, dehydrated and confused, requiring sternal rub by EMS. Patient was found to have CKMB 3666, troponin 0.02, WBC 15.5 but afebrile. Hr received 3L IVF and is currently in ER holding for admission. In the ED patient had an episode of agitation and received ativan 1mg. Patient currently presents with improvement in mental status but continues to have memory impairment and disorientation, likely secondary to delirium due to dehydration vs infection.  Plan:  -continue treatment of underlying medical causes of delirium  -avoid benzodiazepines/anticholinergics as they can worsen delirium and agitation  -if the patient is agitated can give haldol 1mg po/im q6h prn agitation, monitor EKG for Qtc prolongation  -continue melatonin 3mg po qhs to target sleep-awake cycle reversal associated with delirium  -can also give trazodone 50mg po qhs prn insomnia

## 2018-11-29 NOTE — SWALLOW FEES ASSESSMENT ADULT - PHARYNGEAL PHASE COMMENTS
Decreased coordination. Spillage over the epiglottis prior to swallow initiation w large sips of thin liquids, resulting in immediate cough. Decreased BOT retraction and pharyngeal squeeze. Post-deglutitive residue across consistencies; worst w solids; worst at level of valleculae. Self-initiated secondary swallow reduced residue but did not clear it. Residue was mostly cleared w a cued liquid wash. Thin liquid residue from pyriforms noted to inconsistently spill into laryngeal vestibule resulting in at least penetration. Cannot r/o trace aspiration.

## 2018-11-29 NOTE — SWALLOW FEES ASSESSMENT ADULT - ADDITIONAL RECOMMENDATIONS
1) Consider allowing small sips of thin liq after oral care has been provided, in between meals, despite risk of asp, to decrease risk of dehydration (given pt reports of disliking NTL) and for pleasure.   2) Pt would benefit from dysphagia therapy in a rehab setting to target improving strength of BOT retraction and pharyngeal squeeze, to work towards diet advancement to thin liquids.   3) Consider Occupational Therapy evaluation given pt's family reports of pt's difficulty w fine motor tasks such as buttoning his shirt and placing his hearing aids.   4) Per PA, pt with pending discharge to sub-acute facility this afternoon. Given this, this service is s/o. Reconsult PRN.

## 2018-11-29 NOTE — DISCHARGE NOTE ADULT - COMMUNITY RESOURCES
[facility]  [address] [place] [address]  P: [phone] or [phone]

## 2018-11-29 NOTE — PROGRESS NOTE ADULT - ATTENDING COMMENTS
Toxic metabolic encephalopathy has resolved  Patient is more awake and conversant today, daughter at bedside  Dehydration has resolved  Stable hemoglobin,  c/w PPI  Ok for discharge to [facility] from medicine perspective  Rest as above Toxic metabolic encephalopathy has resolved  Patient is more awake and conversant today, daughter at bedside  Dehydration has resolved. Azotemia resolved.  Stable hemoglobin,  c/w PPI  Ok for discharge to [facility] from medicine perspective  Rest as above

## 2018-11-29 NOTE — DISCHARGE NOTE ADULT - PATIENT PORTAL LINK FT
You can access the [facility] Portal, offered by [facility], by registering with the following website: [email or web address]

## 2018-11-29 NOTE — SWALLOW FEES ASSESSMENT ADULT - RECOMMENDED FEEDING/EATING TECHNIQUES
oral hygiene/alternate food with liquid/position upright (90 degrees)/small sips/bites/Clean oral cavity after meals./maintain upright posture during/after eating for 30 mins

## 2018-11-29 NOTE — DISCHARGE NOTE ADULT - MEDICATION SUMMARY - MEDICATIONS TO STOP TAKING
I will STOP taking the medications listed below when I get home from the hospital:    pantoprazole 40 mg oral delayed release tablet  -- 1 tab(s) by mouth once a day

## 2018-11-29 NOTE — DISCHARGE NOTE ADULT - HOSPITAL COURSE
This is a 86 y.o male with HTN, prostate cancer treated with seed implant, CAD ? CABG, GERD, diverticulitis, bronchitis, dementia on Donepezil. Pt lives alone, has multiples falls in the past year, BIBAwith AMS after fall vs syncope. Pt last seen functioning at baseline on Friday evening by son; On day of admission, son found pt on the floor next to bed and dresser, + AMS unclear for how long pt was on the floor, unclear precipitating events. Pt required sternal rub by EMS and in the ER for response. In the ER, he was found dehydrated, CKMB 3666, Troponin 0.02, elevated LFTs, BUN 31, WBC 15.5, afebrile, VSS. CT Head and CT spine negative for infarct/hemorrhage/fracture; CXR negative, Hip XRay negative. He received 3 Liters NS and became somewhat more alert then ripped off his IV line, became agitated, requiring Ativan 1mg and soft restraints to all 4 limbs.  He is admitted for fall vs syncope workup, observation and possible safe discharge. This is a 86 y.o male with HTN, prostate cancer treated with seed implant, CAD ? CABG, GERD, diverticulitis, bronchitis, dementia on Donepezil. Pt lives alone, has multiples falls in the past year, BIBAwith AMS after fall vs syncope. Pt last seen functioning at baseline on Friday evening by son; On day of admission, son found pt on the floor next to bed and dresser, + AMS unclear for how long pt was on the floor, unclear precipitating events. Pt required sternal rub by EMS and in the ER for response. In the ER, he was found dehydrated, CKMB 3666, Troponin 0.02, elevated LFTs, BUN 31, WBC 15.5, afebrile, VSS. CT Head and CT spine negative for infarct/hemorrhage/fracture; CXR negative, Hip XRay negative. He received 3 Liters NS and became somewhat more alert then ripped off his IV line, became agitated, requiring Ativan 1mg and soft restraints to all 4 limbs.  He is admitted for fall vs syncope workup, observation and possible safe discharge.  The CT scan of the head and spine were negative for acute fractures. The bilateral hip fractures was negative for fracture. Pt hydrated with intravenous fluids. echocardiogram during your hospital course that normal pumping function of the heart and unchanged from the last echocardiogram performed at outpatient cardiologist Dr. Ugarte's. During the admission pt 3 episodes of nausea with emesis. Pt was evaluated by the gastroenterologist service who suspected a possible peptic ulcer or inflammation of the stomach as the cause and suggested increasing your home pantoprazole dose to 40 mg twice daily for a total of 4 weeks, discontinue the twice daily dosing after the evening dose on December 19, 2018 and then resume pantoprazole 40 mg daily. PT evaluated pt recommending [facility] and speech and swallow recs endorsed. Pt deemed stable for discharge to [facility] per Dr. Nguyen.

## 2018-12-01 LAB
CULTURE RESULTS: SIGNIFICANT CHANGE UP
CULTURE RESULTS: SIGNIFICANT CHANGE UP
SPECIMEN SOURCE: SIGNIFICANT CHANGE UP
SPECIMEN SOURCE: SIGNIFICANT CHANGE UP

## 2018-12-03 DIAGNOSIS — G92 TOXIC ENCEPHALOPATHY: ICD-10-CM

## 2018-12-03 DIAGNOSIS — E86.0 DEHYDRATION: ICD-10-CM

## 2018-12-03 DIAGNOSIS — F03.90 UNSPECIFIED DEMENTIA WITHOUT BEHAVIORAL DISTURBANCE: ICD-10-CM

## 2018-12-03 DIAGNOSIS — R41.82 ALTERED MENTAL STATUS, UNSPECIFIED: ICD-10-CM

## 2018-12-03 DIAGNOSIS — G47.00 INSOMNIA, UNSPECIFIED: ICD-10-CM

## 2018-12-03 DIAGNOSIS — T50.995A ADVERSE EFFECT OF OTHER DRUGS, MEDICAMENTS AND BIOLOGICAL SUBSTANCES, INITIAL ENCOUNTER: ICD-10-CM

## 2018-12-03 DIAGNOSIS — I10 ESSENTIAL (PRIMARY) HYPERTENSION: ICD-10-CM

## 2018-12-03 DIAGNOSIS — I25.10 ATHEROSCLEROTIC HEART DISEASE OF NATIVE CORONARY ARTERY WITHOUT ANGINA PECTORIS: ICD-10-CM

## 2018-12-03 DIAGNOSIS — R29.6 REPEATED FALLS: ICD-10-CM

## 2018-12-03 DIAGNOSIS — Z79.82 LONG TERM (CURRENT) USE OF ASPIRIN: ICD-10-CM

## 2018-12-03 DIAGNOSIS — Z95.1 PRESENCE OF AORTOCORONARY BYPASS GRAFT: ICD-10-CM

## 2018-12-03 DIAGNOSIS — K92.0 HEMATEMESIS: ICD-10-CM

## 2020-09-15 ENCOUNTER — INPATIENT (INPATIENT)
Facility: HOSPITAL | Age: 85
LOS: 2 days | Discharge: HOME HEALTH | DRG: 871 | End: 2020-09-18
Attending: STUDENT IN AN ORGANIZED HEALTH CARE EDUCATION/TRAINING PROGRAM | Admitting: STUDENT IN AN ORGANIZED HEALTH CARE EDUCATION/TRAINING PROGRAM
Payer: MEDICARE

## 2020-09-15 VITALS
DIASTOLIC BLOOD PRESSURE: 81 MMHG | OXYGEN SATURATION: 96 % | RESPIRATION RATE: 18 BRPM | TEMPERATURE: 99 F | SYSTOLIC BLOOD PRESSURE: 157 MMHG | HEART RATE: 108 BPM

## 2020-09-15 DIAGNOSIS — Z95.1 PRESENCE OF AORTOCORONARY BYPASS GRAFT: Chronic | ICD-10-CM

## 2020-09-15 LAB
ALBUMIN SERPL ELPH-MCNC: 3.9 G/DL — SIGNIFICANT CHANGE UP (ref 3.3–5)
ALP SERPL-CCNC: 92 U/L — SIGNIFICANT CHANGE UP (ref 40–120)
ALT FLD-CCNC: 21 U/L — SIGNIFICANT CHANGE UP (ref 10–45)
ANION GAP SERPL CALC-SCNC: 15 MMOL/L — SIGNIFICANT CHANGE UP (ref 5–17)
APPEARANCE UR: CLEAR — SIGNIFICANT CHANGE UP
APTT BLD: 26.5 SEC — LOW (ref 27.5–35.5)
AST SERPL-CCNC: 33 U/L — SIGNIFICANT CHANGE UP (ref 10–40)
BASOPHILS # BLD AUTO: 0.1 K/UL — SIGNIFICANT CHANGE UP (ref 0–0.2)
BASOPHILS NFR BLD AUTO: 0.7 % — SIGNIFICANT CHANGE UP (ref 0–2)
BILIRUB SERPL-MCNC: 0.6 MG/DL — SIGNIFICANT CHANGE UP (ref 0.2–1.2)
BILIRUB UR-MCNC: NEGATIVE — SIGNIFICANT CHANGE UP
BLD GP AB SCN SERPL QL: NEGATIVE — SIGNIFICANT CHANGE UP
BUN SERPL-MCNC: 28 MG/DL — HIGH (ref 7–23)
CALCIUM SERPL-MCNC: 10 MG/DL — SIGNIFICANT CHANGE UP (ref 8.4–10.5)
CHLORIDE SERPL-SCNC: 99 MMOL/L — SIGNIFICANT CHANGE UP (ref 96–108)
CK MB CFR SERPL CALC: 2.4 NG/ML — SIGNIFICANT CHANGE UP (ref 0–6.7)
CK MB CFR SERPL CALC: 2.5 NG/ML — SIGNIFICANT CHANGE UP (ref 0–6.7)
CK SERPL-CCNC: 43 U/L — SIGNIFICANT CHANGE UP (ref 30–200)
CO2 SERPL-SCNC: 24 MMOL/L — SIGNIFICANT CHANGE UP (ref 22–31)
COLOR SPEC: YELLOW — SIGNIFICANT CHANGE UP
CREAT SERPL-MCNC: 1.06 MG/DL — SIGNIFICANT CHANGE UP (ref 0.5–1.3)
DIFF PNL FLD: ABNORMAL
EOSINOPHIL # BLD AUTO: 0.07 K/UL — SIGNIFICANT CHANGE UP (ref 0–0.5)
EOSINOPHIL NFR BLD AUTO: 0.5 % — SIGNIFICANT CHANGE UP (ref 0–6)
GLUCOSE SERPL-MCNC: 146 MG/DL — HIGH (ref 70–99)
GLUCOSE UR QL: NEGATIVE — SIGNIFICANT CHANGE UP
HCT VFR BLD CALC: 35.8 % — LOW (ref 39–50)
HCT VFR BLD CALC: 40.8 % — SIGNIFICANT CHANGE UP (ref 39–50)
HGB BLD-MCNC: 11.1 G/DL — LOW (ref 13–17)
HGB BLD-MCNC: 12.4 G/DL — LOW (ref 13–17)
IMM GRANULOCYTES NFR BLD AUTO: 0.5 % — SIGNIFICANT CHANGE UP (ref 0–1.5)
INR BLD: 1.62 — HIGH (ref 0.88–1.16)
KETONES UR-MCNC: NEGATIVE — SIGNIFICANT CHANGE UP
LACTATE SERPL-SCNC: 1.6 MMOL/L — SIGNIFICANT CHANGE UP (ref 0.5–2)
LACTATE SERPL-SCNC: 2.8 MMOL/L — HIGH (ref 0.5–2)
LEUKOCYTE ESTERASE UR-ACNC: ABNORMAL
LIDOCAIN IGE QN: 24 U/L — SIGNIFICANT CHANGE UP (ref 7–60)
LYMPHOCYTES # BLD AUTO: 1.52 K/UL — SIGNIFICANT CHANGE UP (ref 1–3.3)
LYMPHOCYTES # BLD AUTO: 10.3 % — LOW (ref 13–44)
MAGNESIUM SERPL-MCNC: 1.9 MG/DL — SIGNIFICANT CHANGE UP (ref 1.6–2.6)
MCHC RBC-ENTMCNC: 23.7 PG — LOW (ref 27–34)
MCHC RBC-ENTMCNC: 24.4 PG — LOW (ref 27–34)
MCHC RBC-ENTMCNC: 30.4 GM/DL — LOW (ref 32–36)
MCHC RBC-ENTMCNC: 31 GM/DL — LOW (ref 32–36)
MCV RBC AUTO: 78 FL — LOW (ref 80–100)
MCV RBC AUTO: 78.9 FL — LOW (ref 80–100)
MONOCYTES # BLD AUTO: 0.49 K/UL — SIGNIFICANT CHANGE UP (ref 0–0.9)
MONOCYTES NFR BLD AUTO: 3.3 % — SIGNIFICANT CHANGE UP (ref 2–14)
NEUTROPHILS # BLD AUTO: 12.55 K/UL — HIGH (ref 1.8–7.4)
NEUTROPHILS NFR BLD AUTO: 84.7 % — HIGH (ref 43–77)
NITRITE UR-MCNC: POSITIVE
NRBC # BLD: 0 /100 WBCS — SIGNIFICANT CHANGE UP (ref 0–0)
NRBC # BLD: 0 /100 WBCS — SIGNIFICANT CHANGE UP (ref 0–0)
NT-PROBNP SERPL-SCNC: 1278 PG/ML — HIGH (ref 0–300)
PH UR: 6 — SIGNIFICANT CHANGE UP (ref 5–8)
PLATELET # BLD AUTO: 244 K/UL — SIGNIFICANT CHANGE UP (ref 150–400)
PLATELET # BLD AUTO: 276 K/UL — SIGNIFICANT CHANGE UP (ref 150–400)
POTASSIUM SERPL-MCNC: 4.4 MMOL/L — SIGNIFICANT CHANGE UP (ref 3.5–5.3)
POTASSIUM SERPL-SCNC: 4.4 MMOL/L — SIGNIFICANT CHANGE UP (ref 3.5–5.3)
PROT SERPL-MCNC: 7.9 G/DL — SIGNIFICANT CHANGE UP (ref 6–8.3)
PROT UR-MCNC: 100 MG/DL
PROTHROM AB SERPL-ACNC: 19 SEC — HIGH (ref 10.6–13.6)
RBC # BLD: 4.54 M/UL — SIGNIFICANT CHANGE UP (ref 4.2–5.8)
RBC # BLD: 5.23 M/UL — SIGNIFICANT CHANGE UP (ref 4.2–5.8)
RBC # FLD: 13.8 % — SIGNIFICANT CHANGE UP (ref 10.3–14.5)
RBC # FLD: 13.9 % — SIGNIFICANT CHANGE UP (ref 10.3–14.5)
RETICS #: 61.5 K/UL — SIGNIFICANT CHANGE UP (ref 25–125)
RETICS/RBC NFR: 1.3 % — SIGNIFICANT CHANGE UP (ref 0.5–2.5)
RH IG SCN BLD-IMP: POSITIVE — SIGNIFICANT CHANGE UP
SARS-COV-2 RNA SPEC QL NAA+PROBE: SIGNIFICANT CHANGE UP
SODIUM SERPL-SCNC: 138 MMOL/L — SIGNIFICANT CHANGE UP (ref 135–145)
SP GR SPEC: 1.02 — SIGNIFICANT CHANGE UP (ref 1–1.03)
TROPONIN T SERPL-MCNC: 0.01 NG/ML — SIGNIFICANT CHANGE UP (ref 0–0.01)
TROPONIN T SERPL-MCNC: 0.03 NG/ML — HIGH (ref 0–0.01)
UROBILINOGEN FLD QL: 0.2 E.U./DL — SIGNIFICANT CHANGE UP
WBC # BLD: 13.52 K/UL — HIGH (ref 3.8–10.5)
WBC # BLD: 14.8 K/UL — HIGH (ref 3.8–10.5)
WBC # FLD AUTO: 13.52 K/UL — HIGH (ref 3.8–10.5)
WBC # FLD AUTO: 14.8 K/UL — HIGH (ref 3.8–10.5)

## 2020-09-15 PROCEDURE — 71045 X-RAY EXAM CHEST 1 VIEW: CPT | Mod: 26

## 2020-09-15 PROCEDURE — 99222 1ST HOSP IP/OBS MODERATE 55: CPT | Mod: GC

## 2020-09-15 PROCEDURE — 99223 1ST HOSP IP/OBS HIGH 75: CPT | Mod: GC

## 2020-09-15 PROCEDURE — 99291 CRITICAL CARE FIRST HOUR: CPT | Mod: CS

## 2020-09-15 PROCEDURE — 74176 CT ABD & PELVIS W/O CONTRAST: CPT | Mod: 26

## 2020-09-15 RX ORDER — PANTOPRAZOLE SODIUM 20 MG/1
8 TABLET, DELAYED RELEASE ORAL
Qty: 80 | Refills: 0 | Status: DISCONTINUED | OUTPATIENT
Start: 2020-09-15 | End: 2020-09-15

## 2020-09-15 RX ORDER — FENTANYL CITRATE 50 UG/ML
25 INJECTION INTRAVENOUS ONCE
Refills: 0 | Status: DISCONTINUED | OUTPATIENT
Start: 2020-09-15 | End: 2020-09-15

## 2020-09-15 RX ORDER — CEFTRIAXONE 500 MG/1
1000 INJECTION, POWDER, FOR SOLUTION INTRAMUSCULAR; INTRAVENOUS EVERY 24 HOURS
Refills: 0 | Status: DISCONTINUED | OUTPATIENT
Start: 2020-09-16 | End: 2020-09-18

## 2020-09-15 RX ORDER — ACETAMINOPHEN 500 MG
1000 TABLET ORAL ONCE
Refills: 0 | Status: COMPLETED | OUTPATIENT
Start: 2020-09-15 | End: 2020-09-15

## 2020-09-15 RX ORDER — CEFTRIAXONE 500 MG/1
1000 INJECTION, POWDER, FOR SOLUTION INTRAMUSCULAR; INTRAVENOUS ONCE
Refills: 0 | Status: COMPLETED | OUTPATIENT
Start: 2020-09-15 | End: 2020-09-15

## 2020-09-15 RX ORDER — METOCLOPRAMIDE HCL 10 MG
10 TABLET ORAL ONCE
Refills: 0 | Status: COMPLETED | OUTPATIENT
Start: 2020-09-15 | End: 2020-09-15

## 2020-09-15 RX ORDER — SODIUM CHLORIDE 9 MG/ML
500 INJECTION INTRAMUSCULAR; INTRAVENOUS; SUBCUTANEOUS ONCE
Refills: 0 | Status: COMPLETED | OUTPATIENT
Start: 2020-09-15 | End: 2020-09-15

## 2020-09-15 RX ORDER — PANTOPRAZOLE SODIUM 20 MG/1
80 TABLET, DELAYED RELEASE ORAL ONCE
Refills: 0 | Status: COMPLETED | OUTPATIENT
Start: 2020-09-15 | End: 2020-09-15

## 2020-09-15 RX ORDER — PANTOPRAZOLE SODIUM 20 MG/1
40 TABLET, DELAYED RELEASE ORAL EVERY 12 HOURS
Refills: 0 | Status: DISCONTINUED | OUTPATIENT
Start: 2020-09-16 | End: 2020-09-18

## 2020-09-15 RX ADMIN — PANTOPRAZOLE SODIUM 10 MG/HR: 20 TABLET, DELAYED RELEASE ORAL at 19:42

## 2020-09-15 RX ADMIN — Medication 10 MILLIGRAM(S): at 19:41

## 2020-09-15 RX ADMIN — Medication 400 MILLIGRAM(S): at 21:10

## 2020-09-15 RX ADMIN — SODIUM CHLORIDE 1000 MILLILITER(S): 9 INJECTION INTRAMUSCULAR; INTRAVENOUS; SUBCUTANEOUS at 19:42

## 2020-09-15 RX ADMIN — CEFTRIAXONE 100 MILLIGRAM(S): 500 INJECTION, POWDER, FOR SOLUTION INTRAMUSCULAR; INTRAVENOUS at 22:30

## 2020-09-15 RX ADMIN — Medication 1000 MILLIGRAM(S): at 22:05

## 2020-09-15 RX ADMIN — PANTOPRAZOLE SODIUM 80 MILLIGRAM(S): 20 TABLET, DELAYED RELEASE ORAL at 19:41

## 2020-09-15 RX ADMIN — FENTANYL CITRATE 25 MICROGRAM(S): 50 INJECTION INTRAVENOUS at 20:10

## 2020-09-15 RX ADMIN — SODIUM CHLORIDE 500 MILLILITER(S): 9 INJECTION INTRAMUSCULAR; INTRAVENOUS; SUBCUTANEOUS at 20:10

## 2020-09-15 RX ADMIN — SODIUM CHLORIDE 500 MILLILITER(S): 9 INJECTION INTRAMUSCULAR; INTRAVENOUS; SUBCUTANEOUS at 22:30

## 2020-09-15 RX ADMIN — FENTANYL CITRATE 25 MICROGRAM(S): 50 INJECTION INTRAVENOUS at 19:40

## 2020-09-15 NOTE — ED PROVIDER NOTE - CARE PLAN
Principal Discharge DX:	Coffee ground emesis  Secondary Diagnosis:	On anticoagulant therapy   Principal Discharge DX:	Coffee ground emesis  Secondary Diagnosis:	On anticoagulant therapy  Secondary Diagnosis:	Severe sepsis

## 2020-09-15 NOTE — ED PROVIDER NOTE - PHYSICAL EXAMINATION
CONST: chronically ill appearing NAD w/ dry coffee ground around mouth/on clothes speaking in full sentences although hard of hearing  HEAD: atraumatic  EYES: conjunctivae clear, PERRL, EOMI  ENT: dry coffee ground around mouth, tongue intact  NECK: supple/FROM, no jvd  CARD: large healed surgical scar, tachy regular no murmurs  CHEST: ctab no r/r/w, no stridor/retractions/tripoding  ABD: soft, nd, nttp, no rebound/guarding  EXT: FROM, symmetric distal pulses intact  SKIN: warm, dry, no rash, no pedal edema, cap refill <2sec  NEURO: baseline a+ox1, 5/5 strength x4, gross sensation intact x4 CONST: chronically ill appearing NAD w/ dry coffee ground around mouth/on clothes speaking in full sentences although hard of hearing  HEAD: atraumatic  EYES: conjunctivae clear, PERRL, EOMI  ENT: dry coffee ground around mouth, tongue intact  NECK: supple/FROM, no jvd  CARD: large healed surgical scar, tachy regular no murmurs  CHEST: ctab no r/r/w, no stridor/retractions/tripoding  ABD: soft, nd, nttp, no rebound/guarding  RECTAL: minimal brown stool in rectal vault  EXT: FROM, symmetric distal pulses intact  SKIN: warm, dry, no rash, no pedal edema, cap refill <2sec  NEURO: baseline a+ox1, 5/5 strength x4, gross sensation intact x4

## 2020-09-15 NOTE — ED PROVIDER NOTE - OBJECTIVE STATEMENT
87M dnr (not dni), mild dementia, cad/mi s/p cabg x4 on eliquis (2010), htn, hld, predm, rheumatoid arthritis not on Rx, prior hospitalization for sepsis/pna (1/2020, [facility]), biba from home c/o witnessed coffee ground emesis x1 episode preceded by coughing fit. pt now only reports mild epigastric pain. hha reports she was walking pt to room, he felt "shaky," had coughing fit and then vomited. no ams/loc. at baseline just prior. no prior GIB. no fever/chills, no ha/dizziness, no cp/sob, no diarrhea, no hematochezia/melena, no dysuria, no diverticulitis, no rash, no sick contacts, no prior covid, no trauma, no etoh, no antiplatelet. code status confirmed by daughter: kelsey amado.    at baseline, a+ox3, few ADLs, ambulates w/ walker/wheelchair, lives at home w/ 24h hha. 87M dnr (NOT dni), mild dementia, cad/mi s/p cabg x4 on eliquis (2010), htn, hld, predm, rheumatoid arthritis not on Rx, prior hospitalization for sepsis/pna (1/2020, [facility]), biba from home c/o witnessed coffee ground emesis x1 episode preceded by coughing fit. pt now only reports mild epigastric pain. hha reports she was walking pt to room, he felt "shaky," had coughing fit and then vomited. no ams/loc. at baseline just prior. no prior GIB. no fever/chills, no ha/dizziness, no cp/sob, no diarrhea, no hematochezia/melena, no dysuria, no diverticulitis, no rash, no sick contacts, no prior covid, no trauma, no etoh, no antiplatelet. code status confirmed by daughter: kelsey amado.    at baseline, a+ox3, few ADLs, ambulates w/ walker/wheelchair, lives at home w/ 24h hha.

## 2020-09-15 NOTE — ED ADULT NURSE NOTE - PMH
Information your provider wants you to know    Your opinion matters! Thank you for choosing Dr. Opal Pickens at [facility].You might receive a survey in the mail or via e-mail about your experience today to evaluate our clinic. Please fill out and return it in the pre-paid envelope. It was a pleasure to care for you today!    Please include comments and feedback to our office on how we can improve.       Clinic Policy:    Cancellation and No Show: If you must cancel a visit, please give 24 hours notice so we can accommodate other patients waiting to be seen. As a courtesy our automated system will place a reminder call to you 48 hours prior to your appointment time. Any appointment cancelled less than 2 hours prior to start time will be considered a “No Show”.  You can cancel your appointment by calling our office at [phone] anytime or online via your [employer] account. Our office is usually closed on Fridays.     Forms (FMLA/ Disability):    Please complete your portion of any forms prior to bringing them into the office. This includes adding a telephone number where you can be reached during normal business hours. Please allow 7-14 days for any form to be completed. Some FMLA forms will need appointment to be completed.     Medication Requests:    Please plan ahead. We need up to 2 business days notice for refills to be completed. Please contact your preferred pharmacy for your refills. The pharmacy will contact the office for the refills.     Messages:    Message left for Dr. Opal Pickens will be returned within 24 business hours or sooner.     Test results:    Our goal is to report all test results ordered by Dr. Opal Pickens within 7-14 days. If you do not receive your test results either by phone or [employer] message within 14 days after your visit with our office, please call our office at [phone] and ask to speak with a member of our care team or send your care  team a message via your [employer] account.        Bronchitis    CAD (coronary artery disease)    Dementia    Depression    Diverticulitis    Falls frequently    Hypertension    Osteopenia    Prostate cancer

## 2020-09-15 NOTE — ED PROVIDER NOTE - PROGRESS NOTE DETAILS
GI consulted. agrees w/ protonix bolus/gtt. will scheduled egd in AM. npo now. will see pt upon admission. daughter (kelsey) and son (at bedside) updated and agree w/ plan. elliot consulted. will see pt. micu consulted per hospitalist request. will see pt. micu consulted per hospitalist request. will see pt.    pt found to have fever 101.5. s/p tylenol/abx. s/p judicious ivf [facility].

## 2020-09-15 NOTE — ED ADULT NURSE NOTE - OBJECTIVE STATEMENT
Patient in resus room from triage for upper GI bleed, MD Carter at bedside. Episode of coffee ground emesis. As per son patient has baseline dementia. lethargic and oriented to person and place. 2 large bore IV started, antiemetic given and protonix as ordered along with IV fluid bolus. Type and screen sent as ordered. Pt appears comfortable after fentanyl as ordered, son now at bedside with patient and verbalized understading in plan of care.

## 2020-09-15 NOTE — CONSULT NOTE ADULT - ASSESSMENT
87M poor historian, DNR (not dni), mild dementia, CAD/MI s/p CABG x4 (family reports patient on eliquis since CABG in 2010 but not on antiplatelets), HTN, HLD, pre-DM, rheumatoid arthritis not on Rx, prior hospitalization for sepsis/pna (1/2020, [facility]), BIBA from home c/o witnessed coffee ground emesis x1 episode preceded by coughing fit w/ no further episodes of hematemesis and light brown stool on rectal exam now w/ fever to 101.4.    #Hematemesis  Patient w/ one episode of hematemesis 87M poor historian, DNR (not dni), mild dementia, CAD/MI s/p CABG x4 (family reports patient on eliquis since CABG in 2010 but not on antiplatelets), HTN, HLD, pre-DM, rheumatoid arthritis not on Rx, prior hospitalization for sepsis/pna (1/2020, [facility]), BIBA from home c/o witnessed coffee ground emesis x1 episode preceded by coughing fit w/ no further episodes of hematemesis and light brown stool on rectal exam now w/ fever to 101.4.    #Hematemesis  Patient w/ one episode of hematemesis while on eliquis. VSS. Rectal exam w/ light brown stool and no evidence of hematochezia or melena. Hemoglobin 12.4-->11.1 (after 500 cc bolus NS). No evidence of hypotension or tachycardia suggestive of shock or large volume blood loss.   - Would hold eliquis for now (also unclear reasoning why patient on eliquis as typically not used as treatment for CABG though family believes that is why it was started and does not believe it was for abnormal rhythm 87M poor historian, DNR (not dni), mild dementia, CAD/MI s/p CABG x4 (family reports patient on eliquis since CABG in 2010 but not on antiplatelets), HTN, HLD, pre-DM, rheumatoid arthritis not on Rx, prior hospitalization for sepsis/pna (1/2020, [facility]), BIBA from home c/o witnessed coffee ground emesis x1 episode preceded by coughing fit w/ no further episodes of hematemesis and light brown stool on rectal exam now w/ fever to 101.4.    #Hematemesis  Patient w/ one episode of hematemesis while on eliquis. VSS. Rectal exam w/ light brown stool and no evidence of hematochezia or melena. Hemoglobin 12.4-->11.1 (after 500 cc bolus NS). No evidence of hypotension or tachycardia suggestive of shock or large volume blood loss.   - Would hold eliquis for now (also unclear reasoning why patient on eliquis as typically not used as treatment for CABG though family believes that is why it was started and does not believe it was for abnormal rhythm  - C/w protonix gtt  - F/u GI recs  - Patient w/ no active signs of bleeding at this time; would continue to monitor on F    #Hypovolemia  Patient appears dry on exam w/ decreasing BP. May be confounded by fentanyl he received in ED vs. 2/2 sepsis vs. poor PO intake vs. GIB  - Would give additional 500cc IVF to total 1L. EF in 2018 50-55%    #Metabolic encephalopathy/ SIRS  Patient w/ AMS and meeting SIRS criteria. Likely 2/2 sepsis from infectious source (consider UTI). No obvious infiltrates on CXR though son does note patient w/ concern for aspiration w/ eating. Speech and swallow in 2018 w/o obvious aspiration events  - Would check UA and urine cx  - Continue to monitor for signs of aspiration and consider speech and swallow evaluation    Patient stable for admission to [facility]

## 2020-09-15 NOTE — ED PROVIDER NOTE - CLINICAL SUMMARY MEDICAL DECISION MAKING FREE TEXT BOX
initially afebrile, later temp 101.5. hds. no hypoxia. a+ox3. no active cp. no acute resp distress. no acute surgical abd. no recurrent emesis while in ED. brown stool on rectal exam. hb 12s>11s s/p ivf. no coagulopathy. s/p protonix bolus/gtt. GI consulted. agrees w/ plan. reccs for npo/EGD in AM. found to have severe sepsis, lactate improved s/p judicious ivf. s/p abx. bcx pending. ua/covid pending upon admission, admitted team aware and will fu. trop 0.01. bnp minimally elevated. ekg w/o acute abnl. cxr w/o acute focal consol vs ptx vs pulm edema vs free intraabd air. micu consulted per hospitalist request. micu reccs for regional. will admit.

## 2020-09-15 NOTE — CONSULT NOTE ADULT - SUBJECTIVE AND OBJECTIVE BOX
[facility] SERVICE CONSULTATION NOTE    CC: Hematemesis    HPI: 87M poor historian, DNR (not dni), mild dementia, CAD/MI s/p CABG x4 (family reports patient on eliquis since CABG in 2010 but not on antiplatelets), HTN, HLD, pre-DM, rheumatoid arthritis not on Rx, prior hospitalization for sepsis/pna (1/2020, [facility]), BIBA from home c/o witnessed coffee ground emesis x1 episode preceded by coughing fit. HHA noted that patient was walking at home w/ complaints of abdominal pain and coughing fit which preceded one episode of coffee ground emesis. She called 911 and brought patient to ED. Other preceding symptoms unknown as HHA not at bedside during interview and son with minimal information regarding patient's care. ROS pertinent for nausea but no complaints of fever, chills, abdominal pain, diarrhea, constipation, dysuria.     In ED, vitals Tmax-101.4, HR-, BP-157/81, RR-18, SpO2-96% on RA. Labs w/ WBC 14.8, H/H 12.4/40.8, plt 276, lactate 2.8, BUN/Cr 28/1.06 (Cr 0.64 11/2018). BNP 1278, Troponin 0.01. Patient received 500cc bolus NS, fentanyl, reglan and protonix IVP and was started on protonix gtt. ICU was consulted for possible need for telemetry bed in the setting of hematemesis.    ROS:  Otherwise negative, except as specified in HPI.    PAST MEDICAL & SURGICAL HISTORY:  Falls frequently    Hypertension    Prostate cancer    Depression    Osteopenia    Bronchitis    Diverticulitis    CAD (coronary artery disease)    Dementia    S/P CABG (coronary artery bypass graft)        FH: Unknown    SH: Lives at home w/ 24 hour HHA, walks w/ walker/wheelchair.     Allergies    No Known Allergies    Intolerances    lactose (Unknown)  predniSONE (Stomach Upset)      MEDICATIONS:    VITAL SIGNS:  ICU Vital Signs Last 24 Hrs  T(C): 38.6 (15 Sep 2020 21:01), Max: 38.6 (15 Sep 2020 21:01)  T(F): 101.4 (15 Sep 2020 21:01), Max: 101.4 (15 Sep 2020 21:01)  HR: 79 (15 Sep 2020 22:31) (76 - 108)  BP: 99/56 (15 Sep 2020 22:31) (99/56 - 157/81)  BP(mean): --  ABP: --  ABP(mean): --  RR: 20 (15 Sep 2020 22:31) (18 - 22)  SpO2: 96% (15 Sep 2020 22:31) (94% - 100%)    CAPILLARY BLOOD GLUCOSE      POCT Blood Glucose.: 148 mg/dL (15 Sep 2020 19:12)      PHYSICAL EXAM:  Constitutional: Elderly male resting comfortably in bed, NAD  HEENT: NC/AT; PERRL, anicteric sclera; no oropharyngeal erythema or exudates; dried brown around mouth and chin, dry mucosal membranes  Neck: supple, no appreciable JVD  Respiratory: CTA B/L, no W/R/R; respirations appear non-labored  Cardiovascular: +S1/S2, RRR  Gastrointestinal: abdomen soft, NT/ND  Extremities: WWP; no clubbing, cyanosis or edema  Vascular: 2+ radial, femoral, and DP/PT pulses B/L  Dermatologic: skin normal color and turgor; no visible rashes, +actinic keratoses on chest, RLE w/ mid-calf lesion non-tender, non-erythematous  Neurological: AAOx2-3 (knows name, that he is at a hospital and that it is 2020 but unsure of which hospital and what month). Strength 4+/5 of B/L upper and lower extremities    LABS:                        11.1   13.52 )-----------( 244      ( 15 Sep 2020 22:11 )             35.8     09-15    138  |  99  |  28<H>  ----------------------------<  146<H>  4.4   |  24  |  1.06    Ca    10.0      15 Sep 2020 19:20  Mg     1.9     09-15    TPro  7.9  /  Alb  3.9  /  TBili  0.6  /  DBili  x   /  AST  33  /  ALT  21  /  AlkPhos  92  09-15    PT/INR - ( 15 Sep 2020 19:20 )   PT: 19.0 sec;   INR: 1.62          PTT - ( 15 Sep 2020 19:20 )  PTT:26.5 sec  Lactate, Blood: 1.6 mmol/L (09-15-20 @ 22:12)  Lactate, Blood: 2.8 mmol/L (09-15-20 @ 19:29)    CARDIAC MARKERS ( 15 Sep 2020 22:11 )  x     / 0.03 ng/mL / x     / x     / x      CARDIAC MARKERS ( 15 Sep 2020 19:20 )  x     / 0.01 ng/mL / 40 U/L / x     / 2.4 ng/mL            EKG: Sinus tachy    RADIOLOGY & ADDITIONAL TESTS: CXR unchanged from prior. No obvious infiltrates

## 2020-09-15 NOTE — CONSULT NOTE ADULT - ATTENDING COMMENTS
dementia poor functional status  episode of "coffee gronds" vomitting small vol  dehydration dry MM, mild lactic acidosis  fever   hemodynamically adequate. TTE recently good LV fxn  would treat with further ivf.   no indication for tele ICU

## 2020-09-15 NOTE — ED ADULT NURSE NOTE - NSIMPLEMENTINTERV_GEN_ALL_ED
Implemented All Fall with Harm Risk Interventions:  [place] to call system. Call bell, personal items and telephone within reach. Instruct patient to call for assistance. Room bathroom lighting operational. Non-slip footwear when patient is off stretcher. Physically safe environment: no spills, clutter or unnecessary equipment. Stretcher in lowest position, wheels locked, appropriate side rails in place. Provide visual cue, wrist band, yellow gown, etc. Monitor gait and stability. Monitor for mental status changes and reorient to person, place, and time. Review medications for side effects contributing to fall risk. Reinforce activity limits and safety measures with patient and family. Provide visual clues: red socks.

## 2020-09-16 DIAGNOSIS — K92.0 HEMATEMESIS: ICD-10-CM

## 2020-09-16 DIAGNOSIS — M06.9 RHEUMATOID ARTHRITIS, UNSPECIFIED: ICD-10-CM

## 2020-09-16 DIAGNOSIS — K92.2 GASTROINTESTINAL HEMORRHAGE, UNSPECIFIED: ICD-10-CM

## 2020-09-16 DIAGNOSIS — C61 MALIGNANT NEOPLASM OF PROSTATE: ICD-10-CM

## 2020-09-16 DIAGNOSIS — I10 ESSENTIAL (PRIMARY) HYPERTENSION: ICD-10-CM

## 2020-09-16 DIAGNOSIS — Z29.9 ENCOUNTER FOR PROPHYLACTIC MEASURES, UNSPECIFIED: ICD-10-CM

## 2020-09-16 DIAGNOSIS — A41.9 SEPSIS, UNSPECIFIED ORGANISM: ICD-10-CM

## 2020-09-16 DIAGNOSIS — I25.10 ATHEROSCLEROTIC HEART DISEASE OF NATIVE CORONARY ARTERY WITHOUT ANGINA PECTORIS: ICD-10-CM

## 2020-09-16 DIAGNOSIS — E78.5 HYPERLIPIDEMIA, UNSPECIFIED: ICD-10-CM

## 2020-09-16 DIAGNOSIS — I48.0 PAROXYSMAL ATRIAL FIBRILLATION: ICD-10-CM

## 2020-09-16 DIAGNOSIS — N39.0 URINARY TRACT INFECTION, SITE NOT SPECIFIED: ICD-10-CM

## 2020-09-16 DIAGNOSIS — N30.00 ACUTE CYSTITIS WITHOUT HEMATURIA: ICD-10-CM

## 2020-09-16 PROBLEM — F32.9 MAJOR DEPRESSIVE DISORDER, SINGLE EPISODE, UNSPECIFIED: Chronic | Status: ACTIVE | Noted: 2018-11-26

## 2020-09-16 PROBLEM — M85.80 OTHER SPECIFIED DISORDERS OF BONE DENSITY AND STRUCTURE, UNSPECIFIED SITE: Chronic | Status: ACTIVE | Noted: 2018-11-26

## 2020-09-16 PROBLEM — K57.92 DIVERTICULITIS OF INTESTINE, PART UNSPECIFIED, WITHOUT PERFORATION OR ABSCESS WITHOUT BLEEDING: Chronic | Status: ACTIVE | Noted: 2018-11-26

## 2020-09-16 PROBLEM — R29.6 REPEATED FALLS: Chronic | Status: ACTIVE | Noted: 2018-11-26

## 2020-09-16 PROBLEM — F03.90 UNSPECIFIED DEMENTIA WITHOUT BEHAVIORAL DISTURBANCE: Chronic | Status: ACTIVE | Noted: 2018-11-26

## 2020-09-16 LAB
ANION GAP SERPL CALC-SCNC: 12 MMOL/L — SIGNIFICANT CHANGE UP (ref 5–17)
APTT BLD: 28.8 SEC — SIGNIFICANT CHANGE UP (ref 27.5–35.5)
BASOPHILS # BLD AUTO: 0.06 K/UL — SIGNIFICANT CHANGE UP (ref 0–0.2)
BASOPHILS NFR BLD AUTO: 0.5 % — SIGNIFICANT CHANGE UP (ref 0–2)
BLD GP AB SCN SERPL QL: NEGATIVE — SIGNIFICANT CHANGE UP
BUN SERPL-MCNC: 22 MG/DL — SIGNIFICANT CHANGE UP (ref 7–23)
CALCIUM SERPL-MCNC: 9 MG/DL — SIGNIFICANT CHANGE UP (ref 8.4–10.5)
CHLORIDE SERPL-SCNC: 104 MMOL/L — SIGNIFICANT CHANGE UP (ref 96–108)
CK MB CFR SERPL CALC: 3.6 NG/ML — SIGNIFICANT CHANGE UP (ref 0–6.7)
CK SERPL-CCNC: 61 U/L — SIGNIFICANT CHANGE UP (ref 30–200)
CO2 SERPL-SCNC: 24 MMOL/L — SIGNIFICANT CHANGE UP (ref 22–31)
CREAT SERPL-MCNC: 0.93 MG/DL — SIGNIFICANT CHANGE UP (ref 0.5–1.3)
EOSINOPHIL # BLD AUTO: 0.04 K/UL — SIGNIFICANT CHANGE UP (ref 0–0.5)
EOSINOPHIL NFR BLD AUTO: 0.3 % — SIGNIFICANT CHANGE UP (ref 0–6)
GLUCOSE SERPL-MCNC: 139 MG/DL — HIGH (ref 70–99)
HCT VFR BLD CALC: 35.7 % — LOW (ref 39–50)
HCT VFR BLD CALC: 36.7 % — LOW (ref 39–50)
HGB BLD-MCNC: 10.9 G/DL — LOW (ref 13–17)
HGB BLD-MCNC: 11.1 G/DL — LOW (ref 13–17)
IMM GRANULOCYTES NFR BLD AUTO: 0.5 % — SIGNIFICANT CHANGE UP (ref 0–1.5)
INR BLD: 1.5 — HIGH (ref 0.88–1.16)
LYMPHOCYTES # BLD AUTO: 0.99 K/UL — LOW (ref 1–3.3)
LYMPHOCYTES # BLD AUTO: 8 % — LOW (ref 13–44)
MAGNESIUM SERPL-MCNC: 1.7 MG/DL — SIGNIFICANT CHANGE UP (ref 1.6–2.6)
MCHC RBC-ENTMCNC: 23.9 PG — LOW (ref 27–34)
MCHC RBC-ENTMCNC: 24.1 PG — LOW (ref 27–34)
MCHC RBC-ENTMCNC: 30.2 GM/DL — LOW (ref 32–36)
MCHC RBC-ENTMCNC: 30.5 GM/DL — LOW (ref 32–36)
MCV RBC AUTO: 78.8 FL — LOW (ref 80–100)
MCV RBC AUTO: 78.9 FL — LOW (ref 80–100)
MONOCYTES # BLD AUTO: 1.03 K/UL — HIGH (ref 0–0.9)
MONOCYTES NFR BLD AUTO: 8.3 % — SIGNIFICANT CHANGE UP (ref 2–14)
NEUTROPHILS # BLD AUTO: 10.21 K/UL — HIGH (ref 1.8–7.4)
NEUTROPHILS NFR BLD AUTO: 82.4 % — HIGH (ref 43–77)
NRBC # BLD: 0 /100 WBCS — SIGNIFICANT CHANGE UP (ref 0–0)
NRBC # BLD: 0 /100 WBCS — SIGNIFICANT CHANGE UP (ref 0–0)
PLATELET # BLD AUTO: 228 K/UL — SIGNIFICANT CHANGE UP (ref 150–400)
PLATELET # BLD AUTO: 232 K/UL — SIGNIFICANT CHANGE UP (ref 150–400)
POTASSIUM SERPL-MCNC: 4.3 MMOL/L — SIGNIFICANT CHANGE UP (ref 3.5–5.3)
POTASSIUM SERPL-SCNC: 4.3 MMOL/L — SIGNIFICANT CHANGE UP (ref 3.5–5.3)
PROTHROM AB SERPL-ACNC: 17.6 SEC — HIGH (ref 10.6–13.6)
RBC # BLD: 4.53 M/UL — SIGNIFICANT CHANGE UP (ref 4.2–5.8)
RBC # BLD: 4.65 M/UL — SIGNIFICANT CHANGE UP (ref 4.2–5.8)
RBC # FLD: 13.9 % — SIGNIFICANT CHANGE UP (ref 10.3–14.5)
RBC # FLD: 14 % — SIGNIFICANT CHANGE UP (ref 10.3–14.5)
RH IG SCN BLD-IMP: POSITIVE — SIGNIFICANT CHANGE UP
SODIUM SERPL-SCNC: 140 MMOL/L — SIGNIFICANT CHANGE UP (ref 135–145)
TROPONIN T SERPL-MCNC: 0.03 NG/ML — HIGH (ref 0–0.01)
WBC # BLD: 12.39 K/UL — HIGH (ref 3.8–10.5)
WBC # BLD: 9.93 K/UL — SIGNIFICANT CHANGE UP (ref 3.8–10.5)
WBC # FLD AUTO: 12.39 K/UL — HIGH (ref 3.8–10.5)
WBC # FLD AUTO: 9.93 K/UL — SIGNIFICANT CHANGE UP (ref 3.8–10.5)

## 2020-09-16 PROCEDURE — 99223 1ST HOSP IP/OBS HIGH 75: CPT | Mod: GC

## 2020-09-16 PROCEDURE — 99233 SBSQ HOSP IP/OBS HIGH 50: CPT | Mod: GC

## 2020-09-16 RX ORDER — MAGNESIUM SULFATE 500 MG/ML
2 VIAL (ML) INJECTION ONCE
Refills: 0 | Status: COMPLETED | OUTPATIENT
Start: 2020-09-16 | End: 2020-09-16

## 2020-09-16 RX ORDER — LATANOPROST 0.05 MG/ML
1 SOLUTION/ DROPS OPHTHALMIC; TOPICAL AT BEDTIME
Refills: 0 | Status: DISCONTINUED | OUTPATIENT
Start: 2020-09-16 | End: 2020-09-18

## 2020-09-16 RX ORDER — POTASSIUM CHLORIDE 20 MEQ
1 PACKET (EA) ORAL
Qty: 0 | Refills: 0 | DISCHARGE

## 2020-09-16 RX ORDER — DONEPEZIL HYDROCHLORIDE 10 MG/1
1 TABLET, FILM COATED ORAL
Qty: 0 | Refills: 0 | DISCHARGE

## 2020-09-16 RX ORDER — ATORVASTATIN CALCIUM 80 MG/1
1 TABLET, FILM COATED ORAL
Qty: 0 | Refills: 0 | DISCHARGE

## 2020-09-16 RX ORDER — SODIUM CHLORIDE 9 MG/ML
500 INJECTION INTRAMUSCULAR; INTRAVENOUS; SUBCUTANEOUS ONCE
Refills: 0 | Status: COMPLETED | OUTPATIENT
Start: 2020-09-16 | End: 2020-09-16

## 2020-09-16 RX ORDER — APIXABAN 2.5 MG/1
1 TABLET, FILM COATED ORAL
Qty: 0 | Refills: 0 | DISCHARGE

## 2020-09-16 RX ORDER — SERTRALINE 25 MG/1
1 TABLET, FILM COATED ORAL
Qty: 0 | Refills: 0 | DISCHARGE

## 2020-09-16 RX ORDER — CITALOPRAM 10 MG/1
1 TABLET, FILM COATED ORAL
Qty: 0 | Refills: 0 | DISCHARGE

## 2020-09-16 RX ORDER — ASPIRIN/CALCIUM CARB/MAGNESIUM 324 MG
1 TABLET ORAL
Qty: 0 | Refills: 0 | DISCHARGE

## 2020-09-16 RX ORDER — LANOLIN ALCOHOL/MO/W.PET/CERES
1 CREAM (GRAM) TOPICAL
Qty: 0 | Refills: 0 | DISCHARGE

## 2020-09-16 RX ORDER — LANOLIN ALCOHOL/MO/W.PET/CERES
1 CREAM (GRAM) TOPICAL AT BEDTIME
Refills: 0 | Status: DISCONTINUED | OUTPATIENT
Start: 2020-09-16 | End: 2020-09-18

## 2020-09-16 RX ORDER — SERTRALINE 25 MG/1
25 TABLET, FILM COATED ORAL DAILY
Refills: 0 | Status: DISCONTINUED | OUTPATIENT
Start: 2020-09-16 | End: 2020-09-18

## 2020-09-16 RX ORDER — LANOLIN ALCOHOL/MO/W.PET/CERES
2 CREAM (GRAM) TOPICAL
Qty: 0 | Refills: 0 | DISCHARGE

## 2020-09-16 RX ORDER — LATANOPROST 0.05 MG/ML
1 SOLUTION/ DROPS OPHTHALMIC; TOPICAL
Qty: 0 | Refills: 0 | DISCHARGE

## 2020-09-16 RX ADMIN — Medication 1 MILLIGRAM(S): at 22:01

## 2020-09-16 RX ADMIN — Medication 1 TABLET(S): at 12:44

## 2020-09-16 RX ADMIN — PANTOPRAZOLE SODIUM 40 MILLIGRAM(S): 20 TABLET, DELAYED RELEASE ORAL at 05:53

## 2020-09-16 RX ADMIN — SERTRALINE 25 MILLIGRAM(S): 25 TABLET, FILM COATED ORAL at 12:45

## 2020-09-16 RX ADMIN — CEFTRIAXONE 100 MILLIGRAM(S): 500 INJECTION, POWDER, FOR SOLUTION INTRAMUSCULAR; INTRAVENOUS at 19:36

## 2020-09-16 RX ADMIN — Medication 50 GRAM(S): at 08:18

## 2020-09-16 RX ADMIN — LATANOPROST 1 DROP(S): 0.05 SOLUTION/ DROPS OPHTHALMIC; TOPICAL at 22:24

## 2020-09-16 RX ADMIN — SODIUM CHLORIDE 1000 MILLILITER(S): 9 INJECTION INTRAMUSCULAR; INTRAVENOUS; SUBCUTANEOUS at 04:02

## 2020-09-16 RX ADMIN — PANTOPRAZOLE SODIUM 40 MILLIGRAM(S): 20 TABLET, DELAYED RELEASE ORAL at 19:35

## 2020-09-16 NOTE — H&P ADULT - NSHPPHYSICALEXAM_GEN_ALL_CORE
VITAL SIGNS:  T(C): 37.1 (09-16-20 @ 00:08), Max: 38.6 (09-15-20 @ 21:01)  T(F): 98.8 (09-16-20 @ 00:08), Max: 101.4 (09-15-20 @ 21:01)  HR: 87 (09-16-20 @ 00:08) (76 - 108)  BP: 127/76 (09-16-20 @ 00:08) (99/56 - 157/81)  BP(mean): --  RR: 19 (09-16-20 @ 00:08) (18 - 22)  SpO2: 98% (09-16-20 @ 00:08) (94% - 100%)  Wt(kg): --    PHYSICAL EXAM:  Constitutional: Elderly, frail, resting comfortably in bed; NAD  Head: NC/AT  Eyes: PERRL, EOMI, anicteric sclera, no conjunctival pallor  ENT: no nasal discharge; uvula midline, no oropharyngeal erythema or exudates; dry mucus membranes, tongue with brown coating  Neck: supple; no JVD or thyromegaly  Respiratory: Decreased breath sounds in b/l lung bases, otherwise CTA B/L; no W/R/R, no retractions  Cardiac: +S1/S2; RRR; no M/R/G  Gastrointestinal: abdomen soft, NT/ND; no rebound or guarding; +BSx4, no suprapubic tenderness  Back: no CVAT B/L  Extremities: WWP in b/l UE, cool LE, no clubbing or cyanosis; no peripheral edema  Musculoskeletal: no joint swelling, tenderness or erythema  Vascular: 2+ radial, femoral, 1+ DP/PT pulses B/L  Dermatologic: skin warm, dry and intact; seborrheic keratosis on anterior chest  Lymphatic: no submandibular or cervical LAD  Neurologic: AAOx3; CNII-XII grossly intact  Psychiatric: affect and characteristics of appearance, verbalizations, behaviors are appropriate VITAL SIGNS:  T(C): 37.1 (09-16-20 @ 00:08), Max: 38.6 (09-15-20 @ 21:01)  T(F): 98.8 (09-16-20 @ 00:08), Max: 101.4 (09-15-20 @ 21:01)  HR: 87 (09-16-20 @ 00:08) (76 - 108)  BP: 127/76 (09-16-20 @ 00:08) (99/56 - 157/81)  BP(mean): --  RR: 19 (09-16-20 @ 00:08) (18 - 22)  SpO2: 98% (09-16-20 @ 00:08) (94% - 100%)  Wt(kg): --    PHYSICAL EXAM:  Constitutional: Elderly, frail, resting comfortably in bed; NAD  Head: NC/AT  Eyes: PERRL, EOMI, anicteric sclera, no conjunctival pallor  ENT: no nasal discharge; uvula midline, no oropharyngeal erythema or exudates; dry mucus membranes, tongue with brown coating  Neck: supple; no JVD or thyromegaly  Respiratory: Decreased breath sounds in b/l lung bases, otherwise CTA B/L; no W/R/R, no retractions  Cardiac: +S1/S2; RRR; no M/R/G  Gastrointestinal: abdomen soft, NT/ND; no rebound or guarding; +BSx4, no suprapubic tenderness, rectal exam with light brown stool  Back: no CVAT B/L  Extremities: WWP in b/l UE, cool LE, no clubbing or cyanosis; no peripheral edema  Musculoskeletal: no joint swelling, tenderness or erythema  Vascular: 2+ radial, femoral, 1+ DP/PT pulses B/L  Dermatologic: skin warm, dry and intact; seborrheic keratosis on anterior chest  Lymphatic: no submandibular or cervical LAD  Neurologic: AAOx3; CNII-XII grossly intact  Psychiatric: affect and characteristics of appearance, verbalizations, behaviors are appropriate

## 2020-09-16 NOTE — PROGRESS NOTE ADULT - PROBLEM SELECTOR PLAN 5
H/o HTN not on antihypertensive medication.  - Hold any antiHTN meds at this time in setting of sepsis /81 improved to 127/76. No hx of HTN medication.

## 2020-09-16 NOTE — PHYSICAL THERAPY INITIAL EVALUATION ADULT - ADDITIONAL COMMENTS
Pt lives at home and has 24/7 home health aide. No stairs to enter. Pt uses RW and wheelchair at baseline.

## 2020-09-16 NOTE — PROGRESS NOTE ADULT - PROBLEM SELECTOR PROBLEM 2
Sepsis without acute organ dysfunction, due to unspecified organism Sepsis with multiple organ dysfunction (MOD)

## 2020-09-16 NOTE — H&P ADULT - PROBLEM SELECTOR PLAN 2
Pt septic with positive UA. Per son at bedside, pt had prior h/o kidney stones 8-10 years ago which pt passed on his own. Had a prior episode of urosepsis 2 years ago. CT abd/pelvis noncon on admission with no hydronephrosis, a nonobstructive 3mm stone in the L kidney, and a mildly dilated L ureter. Received CFTX in the ED.  - C/w Ceftriaxone 1 gram q24hr x 10 days  - F/u urine culture  - Bladder scan to assess for urinary retention, straight cath prn for >350cc T 101.4, HR to 108, WBC 14.8 with UA positive for infection, CT abd/pelvis with possible L pyelonephritis. S/p 500ml NS boluses x2 in the ED. Given Ceftriaxone. Lactate 2.8 improved to 1.6.  - Will give additional 500ml NS bolus  - F/u blood cultures  - Treatment of UTI as below

## 2020-09-16 NOTE — H&P ADULT - PROBLEM SELECTOR PLAN 3
Pt with reported episode of nausea and coffee ground emesis at home. No further episodes in the ED. Pt tachycardic and hypotensive in setting of sepsis, responded well to IVF. CBC downtrended from 12.4 to 11.1 likely s/p hemodilution from IVF. Reticulocyte % hypoproliferative. Likely gastritis vs Kathi Cruz tear in setting of nausea from urosepsis. S/p IV pantoprazole 80mg and drip in the ED.  - C/w IV pantoprazole 40mg BID  - Holding home Eliquis  - Keep active T&S and large bore IVs x2  - Trend CBC and monitor for further episodes of emesis Pt with reported episode of nausea and coffee ground emesis at home. No further episodes in the ED. Pt tachycardic and hypotensive in setting of sepsis, responded well to IVF. CBC downtrended from 12.4 to 11.1 likely s/p hemodilution from IVF. Reticulocyte % hypoproliferative. Likely gastritis vs Kathi Cruz tear in setting of nausea/vomiting from urosepsis. S/p IV pantoprazole 80mg and drip in the ED.  - C/w IV pantoprazole 40mg BID  - Holding home Eliquis  - Keep active T&S and large bore IVs x2  - Trend CBC and monitor for further episodes of emesis Pt with reported episode of nausea and coffee ground emesis at home. No further episodes in the ED. Pt tachycardic and hypotensive in setting of sepsis, responded well to IVF. CBC downtrended from 12.4 to 11.1 likely s/p hemodilution from IVF. Reticulocyte % hypoproliferative. Likely gastritis vs Kathi Cruz tear in setting of nausea/vomiting from urosepsis. S/p IV pantoprazole 80mg and drip in the ED.  - C/w IV pantoprazole 40mg BID  - Holding home Eliquis  - Keep active T&S and large bore IVs x2  - Trend CBC and monitor for further episodes of emesis  - F/u GI in the AM Pt septic with positive UA. Per son at bedside, pt had prior h/o kidney stones 8-10 years ago which pt passed on his own. Had a prior episode of urosepsis 2 years ago. CT abd/pelvis noncon on admission with no hydronephrosis, a nonobstructive 4mm stone in the L kidney, and a mildly dilated L ureter with possible pyelonephritis. Received CFTX in the ED.  - C/w Ceftriaxone 1 gram q24hr x 10 days  - F/u urine culture  - Bladder scan to assess for urinary retention, straight cath prn for >350cc

## 2020-09-16 NOTE — H&P ADULT - PROBLEM SELECTOR PLAN 6
H/o CAD/MI s/p CABG x4 in 2010. Previously on aspirin 81mg and atorvastatin 40mg in 2018 however no longer taking.  - Continue to monitor

## 2020-09-16 NOTE — H&P ADULT - PROBLEM SELECTOR PLAN 1
T 101.4, HR to 108, WBC 14.8 with UA positive for infection. S/p 500ml NS boluses x2 in the ED. Given Ceftriaxone. Lactate 2.8 improved to 1.6.  - Will hold further fluids due to decreased EF 50%  - F/u blood cultures  - Treatment of UTI as below Pt with reported episode of nausea and coffee ground emesis at home. No further episodes in the ED. Pt tachycardic and hypotensive in setting of sepsis, responded well to IVF. CBC downtrended from 12.4 to 11.1 likely s/p hemodilution from IVF. Reticulocyte % hypoproliferative. Likely gastritis vs Kathi Cruz tear in setting of nausea/vomiting from urosepsis. S/p IV pantoprazole 80mg and drip in the ED.  - C/w IV pantoprazole 40mg BID  - Holding home Eliquis  - Keep active T&S and large bore IVs x2  - Trend CBC and monitor for further episodes of emesis  - F/u GI in the AM for possible scope Pt with reported episode of nausea and coffee ground emesis at home. No further episodes in the ED. Pt tachycardic and hypotensive in setting of sepsis, responded well to IVF. CBC downtrended from 12.4 to 11.1 likely s/p hemodilution from IVF. Reticulocyte % hypoproliferative. Likely gastritis vs Kathi Cruz tear in setting of nausea/vomiting from urosepsis. S/p IV pantoprazole 80mg and drip in the ED.  - C/w IV pantoprazole 40mg BID  - Holding home Eliquis  - Keep active T&S and large bore IVs x2  - Trend CBC and monitor for further episodes of emesis  - F/u GI in the AM for possible scope    ADDENDUM:  #anemia: monitor cbc , plan as above, NPO pending GI evaluation

## 2020-09-16 NOTE — CHART NOTE - TREATMENT: THE FOLLOWING DIET HAS BEEN RECOMMENDED
Per physical assessment: Muscle Wasting- Temporal [ X  ]  Clavicle/Pectoral [ X  ]  Shoulder/Deltoid [ X  ]  Scapula [   ]  Interosseous [   ]  Quadriceps [   ]  Gastrocnemius [ X  ]; Fat Wasting- Orbital [  X ]  Buccal [X   ]  Triceps [   ]  Rib [ X  ]--> Suspect moderate protein-calorie malnutrition 2/2 to physical assessment    1. As medically feasible, please advance to Regular diet with Ensure Enlive TID (1050 kcal, 60g protein, 540mL free H2O)  2. Continue MVI   3. Monitor lytes and replete prn. POC BG q6hrs   4. Pain and bowel regimens per team

## 2020-09-16 NOTE — H&P ADULT - ATTENDING COMMENTS
patient seen and examined overnight  a/f GIB, found to have sepsis 2/2 UTI      reviewed pertinent data , h&p    pertinent PE findings (in addition to or exception to resident's exam):  elderly male, thin, in NAD, flat affect, Hard of hearing, dry MM, no JVD, s1s2, lungs CTA anteriorly b/l; abdomen soft/nt/nd;     1. GIB/ anemia: monitor h/h, holding AC, NPO pending GI evaluation  2. sepsis w/ UTI: c/w ceftriaxone, followup ctxs      rest of  plan as above

## 2020-09-16 NOTE — PROGRESS NOTE ADULT - PROBLEM SELECTOR PLAN 1
Pt with reported episode of nausea and coffee ground emesis at home. No further episodes in the ED. Pt tachycardic and hypotensive in setting of sepsis, responded well to IVF. CBC downtrended from 12.4 to 11.1 likely s/p hemodilution from IVF. Reticulocyte % hypoproliferative. Likely gastritis vs Kathi Cruz tear in setting of nausea/vomiting from urosepsis. S/p IV pantoprazole 80mg and drip in the ED.  - C/w IV pantoprazole 40mg BID  - Holding home Eliquis  - Keep active T&S and large bore IVs x2  - Trend CBC and monitor for further episodes of emesis  - F/u GI in the AM for possible scope    ADDENDUM:  #anemia: monitor cbc , plan as above, NPO pending GI evaluation Pt with reported episode of workout induced nausea, followed by coughing fit and one episode of coffee ground emesis at home. No further episodes in the ED. Pt tachycardic and hypotensive in setting of sepsis, responded well to IVF. CBC downtrended from 12.4 to 10.8 likely s/p hemodilution from IVF. Reticulocyte % hypoproliferative. CT revealed large hiatal hernia that is non-strangulated and reducible. Sx likely due to increased intraabdominal pressure from working out. Patient had similar episode of coffee ground emesis in 2018 treated with PPI bid x 1 month.  S/p IV pantoprazole 80mg and drip in the ED.  - C/w IV pantoprazole 40mg BID  - Holding home Eliquis  - Keep active T&S and large bore IVs x2  - GI will possibly scope today    ADDENDUM:  #anemia: monitor cbc , plan as above, NPO pending GI evaluation

## 2020-09-16 NOTE — PHYSICAL THERAPY INITIAL EVALUATION ADULT - PLANNED THERAPY INTERVENTIONS, PT EVAL
stretching/gait training/postural re-education/ROM/transfer training/strengthening/balance training/bed mobility training

## 2020-09-16 NOTE — DIETITIAN INITIAL EVALUATION ADULT. - PROBLEM SELECTOR PLAN 1
Pt with reported episode of nausea and coffee ground emesis at home. No further episodes in the ED. Pt tachycardic and hypotensive in setting of sepsis, responded well to IVF. CBC downtrended from 12.4 to 11.1 likely s/p hemodilution from IVF. Reticulocyte % hypoproliferative. Likely gastritis vs Kathi Cruz tear in setting of nausea/vomiting from urosepsis. S/p IV pantoprazole 80mg and drip in the ED.  - C/w IV pantoprazole 40mg BID  - Holding home Eliquis  - Keep active T&S and large bore IVs x2  - Trend CBC and monitor for further episodes of emesis  - F/u GI in the AM for possible scope    ADDENDUM:  #anemia: monitor cbc , plan as above, NPO pending GI evaluation

## 2020-09-16 NOTE — PHYSICAL THERAPY INITIAL EVALUATION ADULT - PERTINENT HX OF CURRENT PROBLEM, REHAB EVAL
87M w/ PMHx of mild dementia, CAD/MI (s/p CABG x4 in 2010), a-fib on Eliquis, HTN, HLD, pre-DM, rheumatoid arthritis not on Rx, prostate cancer, recent hospitalization for sepsis/pna (1/2020 at [facility]), BIBA from home c/o witnessed coffee ground emesis x1 episode preceded by coughing fit. HHA noted that patient was exercising at home w/ complaints of nausea, abdominal pain and coughing fit which preceded one episode of coffee ground emesis. She called 911 and brought the patient to ED.

## 2020-09-16 NOTE — H&P ADULT - PROBLEM SELECTOR PROBLEM 2
Acute cystitis without hematuria Sepsis without acute organ dysfunction, due to unspecified organism

## 2020-09-16 NOTE — DIETITIAN INITIAL EVALUATION ADULT. - PROBLEM SELECTOR PLAN 3
Pt septic with positive UA. Per son at bedside, pt had prior h/o kidney stones 8-10 years ago which pt passed on his own. Had a prior episode of urosepsis 2 years ago. CT abd/pelvis noncon on admission with no hydronephrosis, a nonobstructive 4mm stone in the L kidney, and a mildly dilated L ureter with possible pyelonephritis. Received CFTX in the ED.  - C/w Ceftriaxone 1 gram q24hr x 10 days  - F/u urine culture  - Bladder scan to assess for urinary retention, straight cath prn for >350cc

## 2020-09-16 NOTE — DIETITIAN INITIAL EVALUATION ADULT. - ENERGY NEEDS
Height 72"; ABW 63kg; IBW 80.9kg; 78%IBW  BMI 18.8  IBW used to estimate needs as pt is <80% of IBW. Needs estimated for age and adjusted for sepsis, malnutrition

## 2020-09-16 NOTE — PROGRESS NOTE ADULT - SUBJECTIVE AND OBJECTIVE BOX
OVERNIGHT EVENTS: admitted, Hg re-checked and unchanged    SUBJECTIVE / INTERVAL HPI: Patient seen and examined at bedside.     VITAL SIGNS:  Vital Signs Last 24 Hrs  T(C): 36.9 (16 Sep 2020 05:59), Max: 38.6 (15 Sep 2020 21:01)  T(F): 98.5 (16 Sep 2020 05:59), Max: 101.4 (15 Sep 2020 21:01)  HR: 99 (16 Sep 2020 05:59) (76 - 108)  BP: 131/64 (16 Sep 2020 05:59) (99/56 - 157/81)  BP(mean): --  RR: 18 (16 Sep 2020 05:59) (18 - 22)  SpO2: 95% (16 Sep 2020 05:59) (94% - 100%)      PHYSICAL EXAM:    General: WDWN  HEENT: NC/AT; PERRL, anicteric sclera; MMM  Neck: supple  Cardiovascular: +S1/S2; RRR  Respiratory: CTA B/L; no W/R/R  Gastrointestinal: soft, NT/ND; +BSx4  Extremities: WWP; no edema, clubbing or cyanosis  Vascular: 2+ radial, DP/PT pulses B/L  Neurological: AAOx3; no focal deficits    MEDICATIONS:  MEDICATIONS  (STANDING):  cefTRIAXone   IVPB 1000 milliGRAM(s) IV Intermittent every 24 hours  latanoprost 0.005% Ophthalmic Solution 1 Drop(s) Both EYES at bedtime  melatonin 1 milliGRAM(s) Oral at bedtime  multivitamin 1 Tablet(s) Oral daily  pantoprazole  Injectable 40 milliGRAM(s) IV Push every 12 hours  sertraline 25 milliGRAM(s) Oral daily    MEDICATIONS  (PRN):      ALLERGIES:  Allergies    No Known Allergies    Intolerances    lactose (Unknown)  predniSONE (Stomach Upset)      LABS:                        10.9   12.39 )-----------( 232      ( 16 Sep 2020 06:51 )             35.7     09-16    140  |  104  |  22  ----------------------------<  139<H>  4.3   |  24  |  0.93    Ca    9.0      16 Sep 2020 06:51  Mg     1.7     09-16    TPro  7.9  /  Alb  3.9  /  TBili  0.6  /  DBili  x   /  AST  33  /  ALT  21  /  AlkPhos  92  09-15    PT/INR - ( 16 Sep 2020 06:51 )   PT: 17.6 sec;   INR: 1.50          PTT - ( 16 Sep 2020 06:51 )  PTT:28.8 sec  Urinalysis Basic - ( 15 Sep 2020 22:36 )    Color: Yellow / Appearance: Clear / S.025 / pH: x  Gluc: x / Ketone: NEGATIVE  / Bili: Negative / Urobili: 0.2 E.U./dL   Blood: x / Protein: 100 mg/dL / Nitrite: POSITIVE   Leuk Esterase: Large / RBC: < 5 /HPF / WBC Many /HPF   Sq Epi: x / Non Sq Epi: 0-5 /HPF / Bacteria: Present /HPF      CAPILLARY BLOOD GLUCOSE      POCT Blood Glucose.: 148 mg/dL (15 Sep 2020 19:12)        RADIOLOGY & ADDITIONAL TESTS: Reviewed.    ASSESSMENT:    PLAN: OVERNIGHT EVENTS: admitted, Hg re-checked and unchanged    SUBJECTIVE / INTERVAL HPI: Patient seen and examined at bedside. No repeated episodes of hematemesis. No acute events.     VITAL SIGNS:  Vital Signs Last 24 Hrs  T(C): 36.9 (16 Sep 2020 05:59), Max: 38.6 (15 Sep 2020 21:01)  T(F): 98.5 (16 Sep 2020 05:59), Max: 101.4 (15 Sep 2020 21:01)  HR: 99 (16 Sep 2020 05:59) (76 - 108)  BP: 131/64 (16 Sep 2020 05:59) (99/56 - 157/81)  BP(mean): --  RR: 18 (16 Sep 2020 05:59) (18 - 22)  SpO2: 95% (16 Sep 2020 05:59) (94% - 100%)      PHYSICAL EXAM:    General: WDWN  HEENT: NC/AT; PERRL, anicteric sclera; MMM  Neck: supple  Cardiovascular: +S1/S2; RRR  Respiratory: CTA B/L; no W/R/R  Gastrointestinal: soft, NT/ND; +BSx4  Extremities: WWP; no edema, clubbing or cyanosis  Vascular: 2+ radial, DP/PT pulses B/L  Neurological: AAOx3; no focal deficits    MEDICATIONS:  MEDICATIONS  (STANDING):  cefTRIAXone   IVPB 1000 milliGRAM(s) IV Intermittent every 24 hours  latanoprost 0.005% Ophthalmic Solution 1 Drop(s) Both EYES at bedtime  melatonin 1 milliGRAM(s) Oral at bedtime  multivitamin 1 Tablet(s) Oral daily  pantoprazole  Injectable 40 milliGRAM(s) IV Push every 12 hours  sertraline 25 milliGRAM(s) Oral daily    MEDICATIONS  (PRN):      ALLERGIES:  Allergies    No Known Allergies    Intolerances    lactose (Unknown)  predniSONE (Stomach Upset)      LABS:                        10.9   12.39 )-----------( 232      ( 16 Sep 2020 06:51 )             35.7     09-16    140  |  104  |  22  ----------------------------<  139<H>  4.3   |  24  |  0.93    Ca    9.0      16 Sep 2020 06:51  Mg     1.7     09-16    TPro  7.9  /  Alb  3.9  /  TBili  0.6  /  DBili  x   /  AST  33  /  ALT  21  /  AlkPhos  92  09-15    PT/INR - ( 16 Sep 2020 06:51 )   PT: 17.6 sec;   INR: 1.50          PTT - ( 16 Sep 2020 06:51 )  PTT:28.8 sec  Urinalysis Basic - ( 15 Sep 2020 22:36 )    Color: Yellow / Appearance: Clear / S.025 / pH: x  Gluc: x / Ketone: NEGATIVE  / Bili: Negative / Urobili: 0.2 E.U./dL   Blood: x / Protein: 100 mg/dL / Nitrite: POSITIVE   Leuk Esterase: Large / RBC: < 5 /HPF / WBC Many /HPF   Sq Epi: x / Non Sq Epi: 0-5 /HPF / Bacteria: Present /HPF      CAPILLARY BLOOD GLUCOSE      POCT Blood Glucose.: 148 mg/dL (15 Sep 2020 19:12)        RADIOLOGY & ADDITIONAL TESTS: Reviewed.    ASSESSMENT:    PLAN:

## 2020-09-16 NOTE — H&P ADULT - ASSESSMENT
87M w/ PMHx of mild dementia, CAD/MI (s/p CABG x4 in 2010), a-fib on Eliquis, HTN, HLD, pre-DM, rheumatoid arthritis not on Rx, prostate cancer, recent hospitalization for sepsis/pna (1/2020 at [facility]), BIBA from home c/o witnessed coffee ground emesis x1 episode preceded by coughing fit, admitted for urosepsis. 87M w/ PMHx of mild dementia, CAD/MI (s/p CABG x4 in 2010), a-fib on Eliquis, HTN, HLD, pre-DM, rheumatoid arthritis not on Rx, prostate cancer, recent hospitalization for sepsis/pna (1/2020 at [facility]), BIBA from home c/o witnessed coffee ground emesis x1 episode preceded by coughing fit, admitted for r/o GIB and urosepsis.

## 2020-09-16 NOTE — H&P ADULT - PROBLEM SELECTOR PLAN 8
H/o RA, unknown seropositivity or presence of erosive disease, not on medication.  - Continue to monitor

## 2020-09-16 NOTE — H&P ADULT - PROBLEM SELECTOR PLAN 5
H/o H/o HTN not on antihypertensive medication.  - Hold any antiHTN meds at this time in setting of sepsis

## 2020-09-16 NOTE — PROGRESS NOTE ADULT - PROBLEM SELECTOR PLAN 10
F: s/p 1L NS bolus  E: replete prn  N: NPO  DVT ppx: SCDs  GI ppx: pantoprazole 40mg BID    Code: DNR but not DNI  Dispo: [facility] F: s/p 1L NS bolus  E: replete prn  N: NPO  DVT ppx: SCDs  GI ppx: pantoprazole 40mg BID    - f/u PT  Code: DNR but not DNI  Dispo: [facility]

## 2020-09-16 NOTE — H&P ADULT - NSICDXPASTMEDICALHX_GEN_ALL_CORE_FT
PAST MEDICAL HISTORY:  Atrial fibrillation     CAD (coronary artery disease)     Dementia     Depression     Diverticulitis     Falls frequently     HLD (hyperlipidemia)     Hypertension     Osteopenia     Prostate cancer     Rheumatoid arthritis

## 2020-09-16 NOTE — DIETITIAN INITIAL EVALUATION ADULT. - PROBLEM SELECTOR PLAN 5
H/o HTN not on antihypertensive medication.  - Hold any antiHTN meds at this time in setting of sepsis

## 2020-09-16 NOTE — H&P ADULT - PROBLEM SELECTOR PROBLEM 3
Coffee ground emesis Acute cystitis without hematuria Urinary tract infection without hematuria, site unspecified

## 2020-09-16 NOTE — CONSULT NOTE ADULT - ASSESSMENT
87M w/ PMHx of mild dementia, CAD/MI (s/p CABG x4 in 2010), a-fib on Eliquis, prostate cancer, recent hospitalization for sepsis/pna (1/2020 at [facility]), BIBA from home c/o witnessed coffee ground emesis x1 episode preceded by coughing fit. No further coffee ground emesis while in hospital. Hb 12.4-->11.1-->10.9. CT with large hiatal hernia    # Coffee ground emesis  r/o UGI bleeding 2/2 PUD/Lux ulcers/AVM  - c/w PPI BID  - EGD tomorrow with NPO from [place]  - Monitor CBC    # microcytic anemia  - r/o UGI bleeding as above  - iron studies, b12, folate levels, TSH      Recommendations discussed with primary team  Plan discussed with GI service attending    Arash Moreno MD  PGY-4 GI fellow  Pager: [phone]

## 2020-09-16 NOTE — PROGRESS NOTE ADULT - PROBLEM SELECTOR PLAN 2
T 101.4, HR to 108, WBC 14.8 with UA positive for infection, CT abd/pelvis with possible L pyelonephritis. S/p 500ml NS boluses x2 in the ED. Given Ceftriaxone. Lactate 2.8 improved to 1.6.  - Will give additional 500ml NS bolus  - F/u blood cultures  - Treatment of UTI as below Elevated lactate (2.8 improved to 1.6), troponin (0.03), BNP (1278) indicate mild end organ dysfunction. T 101.4 improved to 98.8.,  improved to 87-99 , WBC 14.8 improved to 12. 39 with UA positive for infection, CT abd/pelvis with possible L pyelonephritis. S/p 500ml NS boluses x2 in the ED. Given IV Ceftriaxone.   - F/u blood cultures  - F/u urine cultures  - treatment w ceftriaxone 1g q24 x 10 days Elevated lactate (2.8 improved to 1.6), troponin (0.03), BNP (1278) indicate mild end organ dysfunction. T 101.4 improved to 98.8.,  improved to 87-99 , WBC 14.8 improved to 12. 39 with UA positive for infection, CT abd/pelvis with possible L pyelonephritis. S/p 500ml NS boluses x2 in the ED, x1 on the floor. Given IV Ceftriaxone.   - F/u blood cultures  - F/u urine cultures  - treatment w ceftriaxone 1g q24 x 10 days

## 2020-09-16 NOTE — DIETITIAN INITIAL EVALUATION ADULT. - OTHER INFO
86 yo/male with PMHx mild dementia, CAD/MI, Afib, HTN, HLD, preDM, RA, recent admission at OSH w/sepsis, now presented s/p episode of coffee-ground emesis. Admitted w/urosepsis and r/o GIB. Pt seen in room this AM, awake, alert, HHA at bedside. Pt confused and very hard of hearing, so HHA assisted w/answering questions. Pt usually eats very well at home, likes eggs, milk, oatmeal, bagels, chicken, vegetables, potatoes, and often drinks Ensure. Noted w/lactose allergy in chart but confirmed that pt does eat lactose-containing foods w/o issue. Does not require altered textures for solids. Currently NPO for possible GI scope. Pt denied N/V/C/D or pain. Afebrile. Skin intact pressure-wise. Pt has had stable weight x 2-3 years (confirmed in EMR only 5kg weight loss x 2 years), though NFPE significant for moderate protein-calorie malnutrition. Discussed diet advancement and addition of ONS. Will continue to follow per RD protocol.

## 2020-09-16 NOTE — PROGRESS NOTE ADULT - PROBLEM SELECTOR PLAN 3
Pt septic with positive UA. Per son at bedside, pt had prior h/o kidney stones 8-10 years ago which pt passed on his own. Had a prior episode of urosepsis 2 years ago. CT abd/pelvis noncon on admission with no hydronephrosis, a nonobstructive 4mm stone in the L kidney, and a mildly dilated L ureter with possible pyelonephritis. Received CFTX in the ED.  - C/w Ceftriaxone 1 gram q24hr x 10 days  - F/u urine culture  - Bladder scan to assess for urinary retention, straight cath prn for >350cc Pt septic with positive UA. Per son at bedside, pt had prior h/o kidney stones 8-10 years ago which pt passed on his own. Had a prior episode of urosepsis 2 years ago. CT abd/pelvis noncon on admission with no hydronephrosis, a nonobstructive 4mm stone in the L kidney, and a mildly dilated L ureter with possible pyelonephritis. Received CFTX in the ED.  - C/w Ceftriaxone 1 gram q24hr x 10 days  - F/u urine culture  - Bladder scan to assess for urinary retention, straight cath prn for >400-500 cc

## 2020-09-16 NOTE — H&P ADULT - PROBLEM SELECTOR PLAN 4
H/o paroxysmal a-fib on Eliquis. EKG on admission with sinus tachycardia.  - Holding home Eliquis for possible GIB as above

## 2020-09-16 NOTE — DIETITIAN INITIAL EVALUATION ADULT. - PROBLEM SELECTOR PLAN 10
F: s/p 1L NS bolus  E: replete prn  N: NPO  DVT ppx: SCDs  GI ppx: pantoprazole 40mg BID    Code: DNR but not DNI  Dispo: [facility]

## 2020-09-16 NOTE — H&P ADULT - HISTORY OF PRESENT ILLNESS
87M w/ PMHx of mild dementia, CAD/MI (s/p CABG x4 in 2010), a-fib on Eliquis, HTN, HLD, pre-DM, rheumatoid arthritis not on Rx, prostate cancer, recent hospitalization for sepsis/pna (1/2020 at [facility]), BIBA from home c/o witnessed coffee ground emesis x1 episode preceded by coughing fit. HHA noted that patient was exercising at home w/ complaints of nausea, abdominal pain and coughing fit which preceded one episode of coffee ground emesis. She called 911 and brought the patient to ED. Other preceding symptoms unknown as HHA not at bedside during interview and son with minimal information regarding patient's care.  ROS negative for fever, chills, SOB, CP, abdominal pain, diarrhea, dysuria.     ED vitals: Tmax-101.4, HR-, BP-157/81, RR-18, SpO2-96% on RA  ED labs: WBC 14.8, H/H 12.4/40.8, plt 276, lactate 2.8->1.6, BUN/Cr 28/1.06 (Cr 0.64 11/2018), BNP 1278, Troponin 0.01->0.03, UA positive  ED imaging: CXR without consolidation or effusion, CT abd/pelvis prelim read without hydronephrosis, mildly dilated L ureter, and 3mm nonobstructive intrarenal stone  ED treatment: Patient received 500cc bolus NS x2, fentanyl, reglan and protonix IVP and was started on protonix gtt. ICU was consulted for possible need for telemetry bed in the setting of hematemesis. 87M w/ PMHx of mild dementia, CAD/MI (s/p CABG x4 in 2010), a-fib on Eliquis, HTN, HLD, pre-DM, rheumatoid arthritis not on Rx, prostate cancer, recent hospitalization for sepsis/pna (1/2020 at [facility]), BIBA from home c/o witnessed coffee ground emesis x1 episode preceded by coughing fit. HHA noted that patient was exercising at home w/ complaints of nausea, abdominal pain and coughing fit which preceded one episode of coffee ground emesis. She called 911 and brought the patient to ED. Other preceding symptoms unknown as HHA not at bedside during interview and son with minimal information regarding patient's care.  ROS negative for fever, chills, SOB, CP, abdominal pain, diarrhea, dysuria.     ED vitals: Tmax-101.4, HR-, BP-157/81, RR-18, SpO2-96% on RA  ED labs: WBC 14.8, H/H 12.4/40.8, plt 276, lactate 2.8->1.6, BUN/Cr 28/1.06 (Cr 0.64 11/2018), BNP 1278, Troponin 0.01->0.03, UA positive  ED imaging: CXR without consolidation or effusion, CT abd/pelvis prelim read without hydronephrosis, dilated L ureter, and 4mm nonobstructive intrarenal stone  ED treatment: Patient received 500cc bolus NS x2, fentanyl, reglan and protonix IVP and was started on protonix gtt. ICU was consulted for possible need for telemetry bed in the setting of hematemesis. 87M w/ PMHx of mild dementia, CAD/MI (s/p CABG x4 in 2010), a-fib on Eliquis, HTN, HLD, pre-DM, rheumatoid arthritis not on Rx, prostate cancer, recent hospitalization for sepsis/pna (1/2020 at [facility]), BIBA from home c/o witnessed coffee ground emesis x1 episode preceded by coughing fit. HHA noted that patient was exercising at home w/ complaints of nausea, abdominal pain and coughing fit which preceded one episode of coffee ground emesis. She called 911 and brought the patient to ED. Other preceding symptoms unknown as HHA not at bedside during interview and son with minimal information regarding patient's care.  ROS negative for fever, chills, SOB, CP, abdominal pain, diarrhea, dysuria.     ED vitals: Tmax-101.4, HR-, BP-157/81, RR-18, SpO2-96% on RA  ED labs: WBC 14.8, H/H 12.4/40.8, plt 276, lactate 2.8->1.6, BUN/Cr 28/1.06 (Cr 0.64 11/2018), BNP 1278, Troponin 0.01->0.03, UA positive  ED imaging: CXR without consolidation or effusion, CT abd/pelvis prelim read without hydronephrosis, dilated L ureter, and L 4mm nonobstructive intrarenal stone  ED treatment: Patient received 500cc bolus NS x2, fentanyl, reglan and protonix IVP and was started on protonix gtt. ICU was consulted for possible need for telemetry bed in the setting of hematemesis.

## 2020-09-16 NOTE — H&P ADULT - PROBLEM SELECTOR PLAN 10
F: s/p 1L NS bolus  E: replete prn  N: NPO  DVT ppx: holding for possible GIB  GI ppx: pantoprazole 40mg BID    Code: DNR but not DNI  Dispo: [facility] F: s/p 1L NS bolus  E: replete prn  N: NPO  DVT ppx: SCDs  GI ppx: pantoprazole 40mg BID    Code: DNR but not DNI  Dispo: [facility]

## 2020-09-16 NOTE — PHYSICAL THERAPY INITIAL EVALUATION ADULT - IMPAIRED TRANSFERS: SIT/STAND, REHAB EVAL
decreased ROM/decreased strength/impaired balance/decreased flexibility/Significant posterior lean/pain/impaired postural control

## 2020-09-16 NOTE — DIETITIAN INITIAL EVALUATION ADULT. - ADD RECOMMEND
1. As medically feasible, please advance to Regular diet with Ensure Enlive TID (1050 kcal, 60g protein, 540mL free H2O) *d/w MD 2. Continue MVI 3. Monitor lytes and replete prn. POC BG q6hrs 4. Pain and bowel regimens per team

## 2020-09-16 NOTE — DIETITIAN INITIAL EVALUATION ADULT. - PROBLEM SELECTOR PLAN 2
T 101.4, HR to 108, WBC 14.8 with UA positive for infection, CT abd/pelvis with possible L pyelonephritis. S/p 500ml NS boluses x2 in the ED. Given Ceftriaxone. Lactate 2.8 improved to 1.6.  - Will give additional 500ml NS bolus  - F/u blood cultures  - Treatment of UTI as below

## 2020-09-16 NOTE — H&P ADULT - NSHPLABSRESULTS_GEN_ALL_CORE
LABS:                         11.1   13.52 )-----------( 244      ( 15 Sep 2020 22:11 )             35.8     09-15    138  |  99  |  28<H>  ----------------------------<  146<H>  4.4   |  24  |  1.06    Ca    10.0      15 Sep 2020 19:20  Mg     1.9     09-15    TPro  7.9  /  Alb  3.9  /  TBili  0.6  /  DBili  x   /  AST  33  /  ALT  21  /  AlkPhos  92  09-15    PT/INR - ( 15 Sep 2020 19:20 )   PT: 19.0 sec;   INR: 1.62          PTT - ( 15 Sep 2020 19:20 )  PTT:26.5 sec  Urinalysis Basic - ( 15 Sep 2020 22:36 )    Color: Yellow / Appearance: Clear / S.025 / pH: x  Gluc: x / Ketone: NEGATIVE  / Bili: Negative / Urobili: 0.2 E.U./dL   Blood: x / Protein: 100 mg/dL / Nitrite: POSITIVE   Leuk Esterase: Large / RBC: < 5 /HPF / WBC Many /HPF   Sq Epi: x / Non Sq Epi: 0-5 /HPF / Bacteria: Present /HPF      CARDIAC MARKERS ( 15 Sep 2020 22:11 )  x     / 0.03 ng/mL / 43 U/L / x     / 2.5 ng/mL  CARDIAC MARKERS ( 15 Sep 2020 19:20 )  x     / 0.01 ng/mL / 40 U/L / x     / 2.4 ng/mL      Serum Pro-Brain Natriuretic Peptide: 1278 pg/mL (09-15 @ 19:20)    Lactate, Blood: 1.6 mmol/L (09-15 @ 22:12)  Lactate, Blood: 2.8 mmol/L (09-15 @ 19:29)      RADIOLOGY, EKG & ADDITIONAL TESTS: Reviewed. LABS:                         11.1   13.52 )-----------( 244      ( 15 Sep 2020 22:11 )             35.8     09-15    138  |  99  |  28<H>  ----------------------------<  146<H>  4.4   |  24  |  1.06    Ca    10.0      15 Sep 2020 19:20  Mg     1.9     09-15    TPro  7.9  /  Alb  3.9  /  TBili  0.6  /  DBili  x   /  AST  33  /  ALT  21  /  AlkPhos  92  09-15    PT/INR - ( 15 Sep 2020 19:20 )   PT: 19.0 sec;   INR: 1.62          PTT - ( 15 Sep 2020 19:20 )  PTT:26.5 sec  Urinalysis Basic - ( 15 Sep 2020 22:36 )    Color: Yellow / Appearance: Clear / S.025 / pH: x  Gluc: x / Ketone: NEGATIVE  / Bili: Negative / Urobili: 0.2 E.U./dL   Blood: x / Protein: 100 mg/dL / Nitrite: POSITIVE   Leuk Esterase: Large / RBC: < 5 /HPF / WBC Many /HPF   Sq Epi: x / Non Sq Epi: 0-5 /HPF / Bacteria: Present /HPF      CARDIAC MARKERS ( 15 Sep 2020 22:11 )  x     / 0.03 ng/mL / 43 U/L / x     / 2.5 ng/mL  CARDIAC MARKERS ( 15 Sep 2020 19:20 )  x     / 0.01 ng/mL / 40 U/L / x     / 2.4 ng/mL      Serum Pro-Brain Natriuretic Peptide: 1278 pg/mL (09-15 @ 19:20)    Lactate, Blood: 1.6 mmol/L (09-15 @ 22:12)  Lactate, Blood: 2.8 mmol/L (09-15 @ 19:29)      RADIOLOGY, EKG & ADDITIONAL TESTS: Reviewed.    < from: CT Abdomen and Pelvis No Cont (09.15.20 @ 23:52) >  IMPRESSION:  1.  Mild diffuse bladder wall thickening, which could represent cystitis.  2.  Dilated and inflamed right ureter and right perinephric infiltration in the absence of an obstructing stone, which could represent ureteritis/pyelonephritis. 4 mm nonobstructing right lower pole intrarenal calculus.  3.  No left-sided stones or hydronephrosis.  < end of copied text >

## 2020-09-16 NOTE — CONSULT NOTE ADULT - SUBJECTIVE AND OBJECTIVE BOX
GASTROENTEROLOGY CONSULT NOTE  HPI:  87M w/ PMHx of mild dementia, CAD/MI (s/p CABG x4 in 2010), a-fib on Eliquis, HTN, HLD, pre-DM, rheumatoid arthritis not on Rx, prostate cancer, recent hospitalization for sepsis/pna (1/2020 at [facility]), BIBA from home c/o witnessed coffee ground emesis x1 episode preceded by coughing fit. HHA noted that patient was exercising at home w/ complaints of nausea, abdominal pain and coughing fit which preceded one episode of coffee ground emesis. She called 911 and brought the patient to ED. Other preceding symptoms unknown as HHA not at bedside during interview and son with minimal information regarding patient's care.  ROS negative for fever, chills, SOB, CP, abdominal pain, diarrhea, dysuria.     ED vitals: Tmax-101.4, HR-, BP-157/81, RR-18, SpO2-96% on RA  ED labs: WBC 14.8, H/H 12.4/40.8, plt 276, lactate 2.8->1.6, BUN/Cr 28/1.06 (Cr 0.64 11/2018), BNP 1278, Troponin 0.01->0.03, UA positive  ED imaging: CXR without consolidation or effusion, CT abd/pelvis prelim read without hydronephrosis, dilated L ureter, and L 4mm nonobstructive intrarenal stone  ED treatment: Patient received 500cc bolus NS x2, fentanyl, reglan and protonix IVP and was started on protonix gtt. ICU was consulted for possible need for telemetry bed in the setting of hematemesis. (16 Sep 2020 00:45)    Allergies    No Known Allergies    Intolerances    lactose (Unknown)  predniSONE (Stomach Upset)    Home Medications:  Eliquis 5 mg oral tablet: 1 tab(s) orally 2 times a day (16 Sep 2020 10:21)  latanoprost 0.005% ophthalmic solution: 1 drop(s) to each affected eye once a day (in the evening) (16 Sep 2020 10:21)  Melatonin 1 mg oral tablet: 2 tab(s) orally once a day (at bedtime) (16 Sep 2020 10:21)  Multiple Vitamins oral tablet: 1 tab(s) orally once a day (16 Sep 2020 10:21)  pantoprazole 40 mg oral delayed release tablet: 1 tab(s) orally 2 times a day (16 Sep 2020 10:21)  Zoloft 25 mg oral tablet: 1 tab(s) orally once a day (16 Sep 2020 10:21)    MEDICATIONS:  MEDICATIONS  (STANDING):  cefTRIAXone   IVPB 1000 milliGRAM(s) IV Intermittent every 24 hours  latanoprost 0.005% Ophthalmic Solution 1 Drop(s) Both EYES at bedtime  melatonin 1 milliGRAM(s) Oral at bedtime  multivitamin 1 Tablet(s) Oral daily  pantoprazole  Injectable 40 milliGRAM(s) IV Push every 12 hours  sertraline 25 milliGRAM(s) Oral daily    MEDICATIONS  (PRN):    PAST MEDICAL & SURGICAL HISTORY:  Rheumatoid arthritis    HLD (hyperlipidemia)    Atrial fibrillation    Falls frequently    Hypertension    Prostate cancer    Depression    Osteopenia    Diverticulitis    CAD (coronary artery disease)    Dementia    S/P CABG (coronary artery bypass graft)      FAMILY HISTORY:  No pertinent family history in first degree relatives      SOCIAL HISTORY:  [place]: [ ] Current, [ ] Former, [ ] Never; Pack Years:  Alcohol:  Illicit Drugs:    REVIEW OF SYSTEMS:  CONSTITUTIONAL: No weakness, fevers or chills  could not be obtained 2/2 AMS    Vital Signs Last 24 Hrs  T(C): 36.9 (16 Sep 2020 05:59), Max: 38.6 (15 Sep 2020 21:01)  T(F): 98.5 (16 Sep 2020 05:59), Max: 101.4 (15 Sep 2020 21:01)  HR: 99 (16 Sep 2020 05:59) (76 - 108)  BP: 131/64 (16 Sep 2020 05:59) (99/56 - 157/81)  BP(mean): --  RR: 18 (16 Sep 2020 05:59) (18 - 22)  SpO2: 95% (16 Sep 2020 05:59) (94% - 100%)      PHYSICAL EXAM:    General: Well developed; in no acute distress,  Eyes: Anicteric sclerae, moist conjunctivae  HENT: Moist mucous membranes  Neck: Trachea midline, supple  Lungs: Normal respiratory effort, no intercostal retractions  Cardiovascular: RRR  Abdomen: Soft, non-tender non-distended No rebound or guarding  Extremities: Normal range of motion, No clubbing, cyanosis or edema  Neurological: Alert and awake, AAX1  Skin: Warm and dry. No obvious rash    LABS:                        10.9   12.39 )-----------( 232      ( 16 Sep 2020 06:51 )             35.7     09-16    140  |  104  |  22  ----------------------------<  139<H>  4.3   |  24  |  0.93    Ca    9.0      16 Sep 2020 06:51  Mg     1.7     09-16    TPro  7.9  /  Alb  3.9  /  TBili  0.6  /  DBili  x   /  AST  33  /  ALT  21  /  AlkPhos  92  09-15        PT/INR - ( 16 Sep 2020 06:51 )   PT: 17.6 sec;   INR: 1.50          PTT - ( 16 Sep 2020 06:51 )  PTT:28.8 sec    RADIOLOGY & ADDITIONAL STUDIES:     Reviewed

## 2020-09-17 ENCOUNTER — TRANSCRIPTION ENCOUNTER (OUTPATIENT)
Age: 85
End: 2020-09-17

## 2020-09-17 DIAGNOSIS — R53.81 OTHER MALAISE: ICD-10-CM

## 2020-09-17 DIAGNOSIS — G30.1 ALZHEIMER'S DISEASE WITH LATE ONSET: ICD-10-CM

## 2020-09-17 DIAGNOSIS — Z51.5 ENCOUNTER FOR PALLIATIVE CARE: ICD-10-CM

## 2020-09-17 LAB
ANION GAP SERPL CALC-SCNC: 10 MMOL/L — SIGNIFICANT CHANGE UP (ref 5–17)
BUN SERPL-MCNC: 21 MG/DL — SIGNIFICANT CHANGE UP (ref 7–23)
CALCIUM SERPL-MCNC: 8.9 MG/DL — SIGNIFICANT CHANGE UP (ref 8.4–10.5)
CHLORIDE SERPL-SCNC: 105 MMOL/L — SIGNIFICANT CHANGE UP (ref 96–108)
CO2 SERPL-SCNC: 24 MMOL/L — SIGNIFICANT CHANGE UP (ref 22–31)
CREAT SERPL-MCNC: 0.96 MG/DL — SIGNIFICANT CHANGE UP (ref 0.5–1.3)
GLUCOSE SERPL-MCNC: 133 MG/DL — HIGH (ref 70–99)
HCT VFR BLD CALC: 34.6 % — LOW (ref 39–50)
HGB BLD-MCNC: 10.7 G/DL — LOW (ref 13–17)
MAGNESIUM SERPL-MCNC: 2 MG/DL — SIGNIFICANT CHANGE UP (ref 1.6–2.6)
MCHC RBC-ENTMCNC: 24.7 PG — LOW (ref 27–34)
MCHC RBC-ENTMCNC: 30.9 GM/DL — LOW (ref 32–36)
MCV RBC AUTO: 79.9 FL — LOW (ref 80–100)
NRBC # BLD: 0 /100 WBCS — SIGNIFICANT CHANGE UP (ref 0–0)
PHOSPHATE SERPL-MCNC: 2.6 MG/DL — SIGNIFICANT CHANGE UP (ref 2.5–4.5)
PLATELET # BLD AUTO: 222 K/UL — SIGNIFICANT CHANGE UP (ref 150–400)
POTASSIUM SERPL-MCNC: 4 MMOL/L — SIGNIFICANT CHANGE UP (ref 3.5–5.3)
POTASSIUM SERPL-SCNC: 4 MMOL/L — SIGNIFICANT CHANGE UP (ref 3.5–5.3)
RBC # BLD: 4.33 M/UL — SIGNIFICANT CHANGE UP (ref 4.2–5.8)
RBC # FLD: 14.2 % — SIGNIFICANT CHANGE UP (ref 10.3–14.5)
SODIUM SERPL-SCNC: 139 MMOL/L — SIGNIFICANT CHANGE UP (ref 135–145)
WBC # BLD: 10.17 K/UL — SIGNIFICANT CHANGE UP (ref 3.8–10.5)
WBC # FLD AUTO: 10.17 K/UL — SIGNIFICANT CHANGE UP (ref 3.8–10.5)

## 2020-09-17 PROCEDURE — 43235 EGD DIAGNOSTIC BRUSH WASH: CPT | Mod: GC,52

## 2020-09-17 PROCEDURE — 99223 1ST HOSP IP/OBS HIGH 75: CPT

## 2020-09-17 PROCEDURE — 99233 SBSQ HOSP IP/OBS HIGH 50: CPT | Mod: GC

## 2020-09-17 RX ORDER — APIXABAN 2.5 MG/1
5 TABLET, FILM COATED ORAL
Refills: 0 | Status: DISCONTINUED | OUTPATIENT
Start: 2020-09-17 | End: 2020-09-18

## 2020-09-17 RX ADMIN — PANTOPRAZOLE SODIUM 40 MILLIGRAM(S): 20 TABLET, DELAYED RELEASE ORAL at 06:40

## 2020-09-17 RX ADMIN — Medication 1 MILLIGRAM(S): at 21:16

## 2020-09-17 RX ADMIN — LATANOPROST 1 DROP(S): 0.05 SOLUTION/ DROPS OPHTHALMIC; TOPICAL at 21:16

## 2020-09-17 RX ADMIN — PANTOPRAZOLE SODIUM 40 MILLIGRAM(S): 20 TABLET, DELAYED RELEASE ORAL at 19:51

## 2020-09-17 RX ADMIN — CEFTRIAXONE 100 MILLIGRAM(S): 500 INJECTION, POWDER, FOR SOLUTION INTRAMUSCULAR; INTRAVENOUS at 19:51

## 2020-09-17 NOTE — PROGRESS NOTE ADULT - PROBLEM SELECTOR PLAN 8
H/o RA, unknown seropositivity or presence of erosive disease, not on medication.  - Continue to monitor
H/o RA, unknown seropositivity or presence of erosive disease, not on medication.  - Continue to monitor

## 2020-09-17 NOTE — DISCHARGE NOTE PROVIDER - NSDCCPCAREPLAN_GEN_ALL_CORE_FT
PRINCIPAL DISCHARGE DIAGNOSIS  Diagnosis: Coffee ground emesis  Assessment and Plan of Treatment:       SECONDARY DISCHARGE DIAGNOSES  Diagnosis: Hiatal hernia  Assessment and Plan of Treatment:     Diagnosis: Afib  Assessment and Plan of Treatment:     Diagnosis: Acute UTI  Assessment and Plan of Treatment:      PRINCIPAL DISCHARGE DIAGNOSIS  Diagnosis: Hiatal hernia  Assessment and Plan of Treatment: A hiatal hernia is a condition in which the upper part of your stomach bulges through an opening in your diaphragm. Your diaphragm is the thin muscle that separates your chest from your abdomen. Your diaphragm helps keep acid from coming up into your esophagus. Therefore, although this condition is relatively benign, you can get increased acid reflex and that can contribute to gastropathy, or the inflammation that was found in your stomach by the GI doctors who performed the upper endoscopy      SECONDARY DISCHARGE DIAGNOSES  Diagnosis: Acute UTI  Assessment and Plan of Treatment: Urinary tract infections, also called "UTIs," are infections that affect either the bladder or the kidneys. Bladder infections are more common than kidney infections. Bladder infections happen when bacteria get into the urethra and travel up into the bladder. Kidney infections happen when the bacteria travel even higher, up into the kidneys. The symptoms of a bladder infection include pain or a burning feeling when you urinate, the need to urinate often, the need to urinate suddenly or in a hurry, blood in the urine. Signs that an infection has spread to the kidneys include fever, back pain, or nausea/vomiting. It is important that you take your antibiotics as prescribed and to completion to properly treat your urinary tract infection and prevent antibiotic resistance.    Diagnosis: Afib  Assessment and Plan of Treatment:     Diagnosis: Hiatal hernia  Assessment and Plan of Treatment:      PRINCIPAL DISCHARGE DIAGNOSIS  Diagnosis: Hiatal hernia  Assessment and Plan of Treatment: A hiatal hernia is a condition in which the upper part of your stomach bulges through an opening in your diaphragm. Your diaphragm is the thin muscle that separates your chest from your abdomen. Your diaphragm helps keep acid from coming up into your esophagus. Therefore, although this condition is relatively benign, you can get increased acid reflex and that can contribute to gastropathy, or the inflammation that was found in your stomach by the GI doctors who performed the upper endoscopy      SECONDARY DISCHARGE DIAGNOSES  Diagnosis: Afib  Assessment and Plan of Treatment: Continue eliquis    Diagnosis: Acute UTI  Assessment and Plan of Treatment: Urinary tract infections, also called "UTIs," are infections that affect either the bladder or the kidneys. Bladder infections are more common than kidney infections. Bladder infections happen when bacteria get into the urethra and travel up into the bladder. Kidney infections happen when the bacteria travel even higher, up into the kidneys. The symptoms of a bladder infection include pain or a burning feeling when you urinate, the need to urinate often, the need to urinate suddenly or in a hurry, blood in the urine. Signs that an infection has spread to the kidneys include fever, back pain, or nausea/vomiting. It is important that you take your antibiotics as prescribed and to completion to properly treat your urinary tract infection and prevent antibiotic resistance.

## 2020-09-17 NOTE — CONSULT NOTE ADULT - SUBJECTIVE AND OBJECTIVE BOX
Patient is a 87y old  Male who presents with a chief complaint of Hematemesis, sepsis (17 Sep 2020 06:34)       HPI:  87M w/ PMHx of mild dementia, CAD/MI (s/p CABG x4 in ), a-fib on Eliquis, HTN, HLD, pre-DM, rheumatoid arthritis not on Rx, prostate cancer, recent hospitalization for sepsis/pna (2020 at [facility]), BIBA from home c/o witnessed coffee ground emesis x1 episode preceded by coughing fit. HHA noted that patient was exercising at home w/ complaints of nausea, abdominal pain and coughing fit which preceded one episode of coffee ground emesis. She called 911 and brought the patient to ED. Other preceding symptoms unknown as HHA not at bedside during interview and son with minimal information regarding patient's care.  ROS negative for fever, chills, SOB, CP, abdominal pain, diarrhea, dysuria.     ED vitals: Tmax-101.4, HR-, BP-157/81, RR-18, SpO2-96% on RA  ED labs: WBC 14.8, H/H 12.4/40.8, plt 276, lactate 2.8->1.6, BUN/Cr 28/1.06 (Cr 0.64 2018), BNP 1278, Troponin 0.01->0.03, UA positive  ED imaging: CXR without consolidation or effusion, CT abd/pelvis prelim read without hydronephrosis, dilated L ureter, and L 4mm nonobstructive intrarenal stone  ED treatment: Patient received 500cc bolus NS x2, fentanyl, reglan and protonix IVP and was started on protonix gtt. ICU was consulted for possible need for telemetry bed in the setting of hematemesis. (16 Sep 2020 00:45)      PAST MEDICAL & SURGICAL HISTORY:  Rheumatoid arthritis    HLD (hyperlipidemia)    Atrial fibrillation    Falls frequently    Hypertension    Prostate cancer    Depression    Osteopenia    Diverticulitis    CAD (coronary artery disease)    Dementia    S/P CABG (coronary artery bypass graft)        MEDICATIONS  (STANDING):  cefTRIAXone   IVPB 1000 milliGRAM(s) IV Intermittent every 24 hours  latanoprost 0.005% Ophthalmic Solution 1 Drop(s) Both EYES at bedtime  melatonin 1 milliGRAM(s) Oral at bedtime  multivitamin 1 Tablet(s) Oral daily  pantoprazole  Injectable 40 milliGRAM(s) IV Push every 12 hours  sertraline 25 milliGRAM(s) Oral daily    MEDICATIONS  (PRN):      Social History:           -  Home Living Status: lives alone in an elevator accessible apartment building           -  Prior Home Care Services:  has 24 hr x 7 day home care coverage    Baseline Functional Level Prior to Admission:           - ADL's:    patient states he "needs help with everything"           - ambulatory status PTA:  walked with a walker and 1 person assistance    FAMILY HISTORY:  No pertinent family history in first degree relatives        CBC Full  -  ( 17 Sep 2020 06:33 )  WBC Count : 10.17 K/uL  RBC Count : 4.33 M/uL  Hemoglobin : 10.7 g/dL  Hematocrit : 34.6 %  Platelet Count - Automated : 222 K/uL  Mean Cell Volume : 79.9 fl  Mean Cell Hemoglobin : 24.7 pg  Mean Cell Hemoglobin Concentration : 30.9 gm/dL  Auto Neutrophil # : x  Auto Lymphocyte # : x  Auto Monocyte # : x  Auto Eosinophil # : x  Auto Basophil # : x  Auto Neutrophil % : x  Auto Lymphocyte % : x  Auto Monocyte % : x  Auto Eosinophil % : x  Auto Basophil % : x          139  |  105  |  21  ----------------------------<  133<H>  4.0   |  24  |  0.96    Ca    8.9      17 Sep 2020 06:33  Phos  2.6       Mg     2.0         TPro  7.9  /  Alb  3.9  /  TBili  0.6  /  DBili  x   /  AST  33  /  ALT  21  /  AlkPhos  92  09-15      Urinalysis Basic - ( 15 Sep 2020 22:36 )    Color: Yellow / Appearance: Clear / S.025 / pH: x  Gluc: x / Ketone: NEGATIVE  / Bili: Negative / Urobili: 0.2 E.U./dL   Blood: x / Protein: 100 mg/dL / Nitrite: POSITIVE   Leuk Esterase: Large / RBC: < 5 /HPF / WBC Many /HPF   Sq Epi: x / Non Sq Epi: 0-5 /HPF / Bacteria: Present /HPF          Radiology:    < from: Xray Chest 1 View-PORTABLE IMMEDIATE (Xray Chest 1 View-PORTABLE IMMEDIATE .) (09.15.20 @ 19:29) >  EXAM:  XR CHEST PORTABLE IMMED 1V                          PROCEDURE DATE:  09/15/2020          INTERPRETATION:  Chest x-ray    Indication: Vomiting blood    A portable frontal view of the chest is compared to the prior study dated 2018. Heart size is enlarged. Retrocardiac opacity is seen suggestive of large hiatal hernia. No pulmonary consolidation is seen. No pleural effusion or pneumothorax. Median sternotomy. Old right rib fractures.      IMPRESSION: Large hiatal hernia. Cardiomegaly.        < from: CT Abdomen and Pelvis No Cont (09.15.20 @ 23:52) >  EXAM:  CT ABDOMEN AND PELVIS                          PROCEDURE DATE:  09/15/2020          INTERPRETATION:  CT of the ABDOMEN and PELVIS without intravenous contrast dated 9/15/2020 11:52 PM    INDICATION: Urosepsis, rule out stone. History of prostate cancer.    TECHNIQUE: CT of the abdomen and pelvis was performed. Intravenous and oral contrast material were not administered in accordance with the renal stone protocol. Axial, sagittal and coronal images and coronal MIP images were produced and reviewed.    COMPARISON: Correlation with hip radiograph 2018.    FINDINGS:    Evaluation of the abdominopelvic viscera is limited due to noncontrast technique.    Lower chest: Few bandlike opacities at both lung bases probably subsegmental atelectasis or scar.. Cardiomegaly. Large hiatal hernia. Aortic valve and annular calcifications. Partially imaged median sternotomy.    Liver: Unremarkable..    Biliary system: Cholelithiasis. No biliary dilatation.    Pancreas: Scattered punctate calcifications throughout the body and tail, which could represent sequelae of chronic pancreatitis. No evidence of acute inflammatory changes.    Spleen: Unremarkable.    Adrenal glands: Unremarkable.    Kidneys: Left hydroureter with periureteralfat stranding without evidence of calcified ureteral stone. No hydronephrosis. Two nonobstructing calculi right kidney measuring 0.1-0.2 cm. Two nonobstructing left-sided intrarenal stones, 0.2-0.4 cm. Subcentimeter right kidney exophytic cyst.    Nohydronephrosis. No renal or ureteral calculus.    Urinary Bladder: Mild circumferential wall thickening. No bladder calculi.    Reproductive organs: Brachytherapy seeds are seen within the prostate.    Bowel/Peritoneum: No bowel obstruction. Colonic diverticulosis. Appendix not definitely visualized absent pericecal inflammatory changes.    Lymph nodes: No lymphadenopathy.    Aorta/IVC: No aneurysm. Abundant aortoiliac atherosclerotic calcifications including ostia celiac trunk, SMA and bilateralrenal arteries.    Abdominal wall: Small fat-containing umbilical hernia. Small fat containing left inguinal hernia.    Bones/Soft tissues: No suspicious osteolytic or blastic lesion. 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    IMPRESSION:  1.  Mild left hydroureter with periureteral and perinephric stranding suggesting inflammation in the absence of an obstructing stone, possibly recently passed stone or ureteritis/pyelitis. No hydronephrosis.  2.  Mild circumferential bladder wall thickening, possibly cystitis.  3.  Nonobstructing punctate bilateral renal calculi.  4.  Large hiatal hernia containing majority of stomach.  5.  Cholelithiasis.  6.  Diverticulosis.  7.  Abundant aortoiliac atherosclerotic calcifications including ostia celiac trunk, SMA and bilateral renal arteries.      Vital Signs Last 24 Hrs  T(C): 36.7 (17 Sep 2020 05:32), Max: 36.8 (16 Sep 2020 20:37)  T(F): 98 (17 Sep 2020 05:32), Max: 98.2 (16 Sep 2020 20:37)  HR: 84 (17 Sep 2020 05:32) (78 - 87)  BP: 147/78 (17 Sep 2020 05:32) (101/60 - 147/78)  BP(mean): --  RR: 16 (17 Sep 2020 05:32) (16 - 18)  SpO2: 97% (17 Sep 2020 05:32) (95% - 97%)    REVIEW OF SYSTEMS:    CONSTITUTIONAL: No fever, weight loss, or fatigue  EYES: No eye pain, visual disturbances, or discharge  ENMT:  No difficulty hearing, tinnitus, vertigo; No sinus or throat pain  NECK: No pain or stiffness  BREASTS: No pain, masses, or nipple discharge  RESPIRATORY: No cough, wheezing, chills or hemoptysis; No shortness of breath  CARDIOVASCULAR: No chest pain, palpitations, dizziness, or leg swelling  GASTROINTESTINAL:  coffee ground emesis  GENITOURINARY: No dysuria, frequency, hematuria, or incontinence  NEUROLOGICAL: No headaches, memory loss, loss of strength, numbness, or tremors  SKIN: No itching, burning, rashes, or lesions   LYMPH NODES: No enlarged glands  ENDOCRINE: No heat or cold intolerance; No hair loss  MUSCULOSKELETAL: No joint pain or swelling; No muscle, back, or extremity pain  PSYCHIATRIC: No depression, anxiety, mood swings, or difficulty sleeping  HEME/LYMPH: No easy bruising, or bleeding gums  ALLERGY AND IMMUNOLOGIC: No hives or eczema  VASCULAR: no swelling, erythema,   : no dysuria, hematuria, frequency        Physical Exam:  frail 86 yo  gentleman lying in semi Ayala's position, hard of hearing, no acute complaints    Head: normocephalic, atraumatic    Eyes: PERRLA, EOMI, no nystagmus, sclera anicteric    ENT: nasal discharge, uvula midline, no oropharyngeal erythema/exudate    Neck: supple, negative JVD, negative carotid bruits, no thyromegaly    Chest:  decreased breath sounds at bases    Cardiovascular: regular rate and rhythm, neg murmurs/rubs/gallops    Abdomen: soft, non distended, non tender to palpation in all 4 quadrants, negative rebound/guarding, normal bowel sounds    Extremities: WWP, neg cyanosis/clubbing/edema, negative calf tenderness to palpation, negative Jay's sign    Musculoskeletal:      :     Neurologic Exam:    Alert and oriented x 2 to person, place, speech fluent w/o dysarthria, recent and remote memory intact, repetition intact, comprehension intact,  attention/concentration intact, fund of knowledge appropriate    Cranial Nerves:     II:                       pupils equal, round and reactive to light, visual fields intact   III/ IV/VI:            extraocular movements intact, neg nystagmus, neg ptosis  V:                       facial sensation intact, V1-3 normal  VII:                     face symmetric, no droop, normal eye closure and smile  VIII:                    hearing intact to finger rub bilaterally  IX/ X:                 soft palate rise symmetrical  XI:                      head turning, shoulder shrug normal  XII:                     tongue midline    Motor Exam:    Upper Extremities:     Right:    no focal weakness > 3+/5              pronator drift: neg    Left:    no focal weakness > 3+/5             pronator drift: neg      Lower Extremities:    Right:    no focal weakness > 3+/5    Left :    no focal weakness > 3+/5               Sensation: Intact to light touch bilaterally                       DTR:            = biceps/     triceps/     brachioradialis                      = patella/   medial hamstring/ankle                      neg clonus                      neg Babinski                        Gait:  not tested        PM&R Impression:    1) deconditioned  2) no focal weakness  3) GIB/ urosepsis    Plan:    1) Physical therapy focusing on therapeutic exercises, bed mobility/transfer out of bed evaluation, progressive ambulation with assistive devices prn.    2) Anticipated Disposition Plan/Recs:    d/c home, home physical therapy, resume 24 hr x 7 day home care coverage

## 2020-09-17 NOTE — DISCHARGE NOTE PROVIDER - NSDCMRMEDTOKEN_GEN_ALL_CORE_FT
Eliquis 5 mg oral tablet: 1 tab(s) orally 2 times a day  latanoprost 0.005% ophthalmic solution: 1 drop(s) to each affected eye once a day (in the evening)  Melatonin 1 mg oral tablet: 2 tab(s) orally once a day (at bedtime)  Multiple Vitamins oral tablet: 1 tab(s) orally once a day  pantoprazole 40 mg oral delayed release tablet: 1 tab(s) orally once a day  Zoloft 25 mg oral tablet: 1 tab(s) orally once a day   cefpodoxime 100 mg oral tablet: Take 1 tab(s) orally 2 times a day   Eliquis 5 mg oral tablet: 1 tab(s) orally 2 times a day  latanoprost 0.005% ophthalmic solution: 1 drop(s) to each affected eye once a day (in the evening)  Melatonin 1 mg oral tablet: 2 tab(s) orally once a day (at bedtime)  Multiple Vitamins oral tablet: 1 tab(s) orally once a day  pantoprazole 40 mg oral delayed release tablet: 1 tab(s) orally once a day  Zoloft 25 mg oral tablet: 1 tab(s) orally once a day

## 2020-09-17 NOTE — PROGRESS NOTE ADULT - NUTRITIONAL ASSESSMENT
This patient has been assessed with a concern for Malnutrition and has been determined to have a diagnosis/diagnoses of Moderate protein-calorie malnutrition and Underweight/BMI < 19.    This patient is being managed with:   Diet NPO after Midnight-     NPO Start Date: 16-Sep-2020   NPO Start Time: 23:59  Except Medications  Entered: Sep 16 2020  4:58PM    Diet Clear Liquid-  Entered: Sep 16 2020  4:35PM

## 2020-09-17 NOTE — PROGRESS NOTE ADULT - SUBJECTIVE AND OBJECTIVE BOX
NOTE IN PROGRESS INCOMPLETE    OVERNIGHT EVENTS:    SUBJECTIVE / INTERVAL HPI: Patient seen and examined at bedside. No fevers/chills, nausea, vomiting, diarrhea, abdominal pain, chest pain, shortness of breath.      VITAL SIGNS:  Vital Signs Last 24 Hrs  T(C): 36.7 (17 Sep 2020 05:32), Max: 36.8 (16 Sep 2020 20:37)  T(F): 98 (17 Sep 2020 05:32), Max: 98.2 (16 Sep 2020 20:37)  HR: 84 (17 Sep 2020 05:32) (78 - 87)  BP: 147/78 (17 Sep 2020 05:32) (101/60 - 147/78)  BP(mean): --  RR: 16 (17 Sep 2020 05:32) (16 - 18)  SpO2: 97% (17 Sep 2020 05:32) (95% - 97%)    PHYSICAL EXAM:    General: WDWN  HEENT: NC/AT; PERRL, anicteric sclera; MMM  Neck: supple  Cardiovascular: +S1/S2; RRR  Respiratory: CTA B/L; no W/R/R  Gastrointestinal: soft, NT/ND; +BSx4  Extremities: WWP; no edema, clubbing or cyanosis  Vascular: 2+ radial, DP/PT pulses B/L  Neurological: AAOx3; no focal deficits    MEDICATIONS:  MEDICATIONS  (STANDING):  cefTRIAXone   IVPB 1000 milliGRAM(s) IV Intermittent every 24 hours  latanoprost 0.005% Ophthalmic Solution 1 Drop(s) Both EYES at bedtime  melatonin 1 milliGRAM(s) Oral at bedtime  multivitamin 1 Tablet(s) Oral daily  pantoprazole  Injectable 40 milliGRAM(s) IV Push every 12 hours  sertraline 25 milliGRAM(s) Oral daily    MEDICATIONS  (PRN):      ALLERGIES:  Allergies    No Known Allergies    Intolerances    predniSONE (Stomach Upset)      LABS:                        10.9   12.39 )-----------( 232      ( 16 Sep 2020 06:51 )             35.7     09-16    140  |  104  |  22  ----------------------------<  139<H>  4.3   |  24  |  0.93    Ca    9.0      16 Sep 2020 06:51  Mg     1.7     09-16    TPro  7.9  /  Alb  3.9  /  TBili  0.6  /  DBili  x   /  AST  33  /  ALT  21  /  AlkPhos  92  09-15    PT/INR - ( 16 Sep 2020 06:51 )   PT: 17.6 sec;   INR: 1.50          PTT - ( 16 Sep 2020 06:51 )  PTT:28.8 sec  Urinalysis Basic - ( 15 Sep 2020 22:36 )    Color: Yellow / Appearance: Clear / S.025 / pH: x  Gluc: x / Ketone: NEGATIVE  / Bili: Negative / Urobili: 0.2 E.U./dL   Blood: x / Protein: 100 mg/dL / Nitrite: POSITIVE   Leuk Esterase: Large / RBC: < 5 /HPF / WBC Many /HPF   Sq Epi: x / Non Sq Epi: 0-5 /HPF / Bacteria: Present /HPF      CAPILLARY BLOOD GLUCOSE      POCT Blood Glucose.: 148 mg/dL (15 Sep 2020 19:12)      RADIOLOGY & ADDITIONAL TESTS: Reviewed.   INCOMPLETE NOTE      OVERNIGHT EVENTS: No acute events overnight.    SUBJECTIVE / INTERVAL HPI: Patient seen and examined at bedside. No fevers/chills, nausea, vomiting, diarrhea, abdominal pain, chest pain, shortness of breath.      VITAL SIGNS:  Vital Signs Last 24 Hrs  T(C): 36.7 (17 Sep 2020 05:32), Max: 36.8 (16 Sep 2020 20:37)  T(F): 98 (17 Sep 2020 05:32), Max: 98.2 (16 Sep 2020 20:37)  HR: 84 (17 Sep 2020 05:32) (78 - 87)  BP: 147/78 (17 Sep 2020 05:32) (101/60 - 147/78)  BP(mean): --  RR: 16 (17 Sep 2020 05:32) (16 - 18)  SpO2: 97% (17 Sep 2020 05:32) (95% - 97%)    PHYSICAL EXAM:    General: WDWN  HEENT: NC/AT; anicteric sclera; MMM  Cardiovascular: +S1/S2; RRR  Respiratory: CTA B/L; no W/R/R  Gastrointestinal: soft, NT/ND; +BSx4  Extremities: WWP; no edema, clubbing or cyanosis  Vascular: 2+ radial, DP pulses B/L  Neurological: AAOx2; no focal deficits, difficult to converse with due to being hard of hearing    MEDICATIONS:  MEDICATIONS  (STANDING):  cefTRIAXone   IVPB 1000 milliGRAM(s) IV Intermittent every 24 hours  latanoprost 0.005% Ophthalmic Solution 1 Drop(s) Both EYES at bedtime  melatonin 1 milliGRAM(s) Oral at bedtime  multivitamin 1 Tablet(s) Oral daily  pantoprazole  Injectable 40 milliGRAM(s) IV Push every 12 hours  sertraline 25 milliGRAM(s) Oral daily    MEDICATIONS  (PRN):      ALLERGIES:  Allergies    No Known Allergies    Intolerances    predniSONE (Stomach Upset)      LABS:                        10.9   12.39 )-----------( 232      ( 16 Sep 2020 06:51 )             35.7     09-16    140  |  104  |  22  ----------------------------<  139<H>  4.3   |  24  |  0.93    Ca    9.0      16 Sep 2020 06:51  Mg     1.7     09-16    TPro  7.9  /  Alb  3.9  /  TBili  0.6  /  DBili  x   /  AST  33  /  ALT  21  /  AlkPhos  92  -15    PT/INR - ( 16 Sep 2020 06:51 )   PT: 17.6 sec;   INR: 1.50          PTT - ( 16 Sep 2020 06:51 )  PTT:28.8 sec  Urinalysis Basic - ( 15 Sep 2020 22:36 )    Color: Yellow / Appearance: Clear / S.025 / pH: x  Gluc: x / Ketone: NEGATIVE  / Bili: Negative / Urobili: 0.2 E.U./dL   Blood: x / Protein: 100 mg/dL / Nitrite: POSITIVE   Leuk Esterase: Large / RBC: < 5 /HPF / WBC Many /HPF   Sq Epi: x / Non Sq Epi: 0-5 /HPF / Bacteria: Present /HPF      CAPILLARY BLOOD GLUCOSE      POCT Blood Glucose.: 148 mg/dL (15 Sep 2020 19:12)      RADIOLOGY & ADDITIONAL TESTS: Reviewed.   OVERNIGHT EVENTS: No acute events overnight.    SUBJECTIVE / INTERVAL HPI: Patient seen and examined at bedside. No fevers/chills, nausea, vomiting, diarrhea, abdominal pain, chest pain, shortness of breath.      VITAL SIGNS:  Vital Signs Last 24 Hrs  T(C): 36.7 (17 Sep 2020 05:32), Max: 36.8 (16 Sep 2020 20:37)  T(F): 98 (17 Sep 2020 05:32), Max: 98.2 (16 Sep 2020 20:37)  HR: 84 (17 Sep 2020 05:32) (78 - 87)  BP: 147/78 (17 Sep 2020 05:32) (101/60 - 147/78)  BP(mean): --  RR: 16 (17 Sep 2020 05:32) (16 - 18)  SpO2: 97% (17 Sep 2020 05:32) (95% - 97%)    PHYSICAL EXAM:    General: WDWN  HEENT: NC/AT; anicteric sclera; MMM  Cardiovascular: +S1/S2; RRR  Respiratory: CTA B/L; no W/R/R  Gastrointestinal: soft, NT/ND; +BSx4  Extremities: WWP; no edema, clubbing or cyanosis  Vascular: 2+ radial, DP pulses B/L  Neurological: AAOx2; no focal deficits, difficult to converse with due to being hard of hearing    MEDICATIONS:  MEDICATIONS  (STANDING):  cefTRIAXone   IVPB 1000 milliGRAM(s) IV Intermittent every 24 hours  latanoprost 0.005% Ophthalmic Solution 1 Drop(s) Both EYES at bedtime  melatonin 1 milliGRAM(s) Oral at bedtime  multivitamin 1 Tablet(s) Oral daily  pantoprazole  Injectable 40 milliGRAM(s) IV Push every 12 hours  sertraline 25 milliGRAM(s) Oral daily    MEDICATIONS  (PRN):      ALLERGIES:  Allergies    No Known Allergies    Intolerances    predniSONE (Stomach Upset)      LABS:                        10.9   12.39 )-----------( 232      ( 16 Sep 2020 06:51 )             35.7     09-16    140  |  104  |  22  ----------------------------<  139<H>  4.3   |  24  |  0.93    Ca    9.0      16 Sep 2020 06:51  Mg     1.7     09-16    TPro  7.9  /  Alb  3.9  /  TBili  0.6  /  DBili  x   /  AST  33  /  ALT  21  /  AlkPhos  92  -15    PT/INR - ( 16 Sep 2020 06:51 )   PT: 17.6 sec;   INR: 1.50          PTT - ( 16 Sep 2020 06:51 )  PTT:28.8 sec  Urinalysis Basic - ( 15 Sep 2020 22:36 )    Color: Yellow / Appearance: Clear / S.025 / pH: x  Gluc: x / Ketone: NEGATIVE  / Bili: Negative / Urobili: 0.2 E.U./dL   Blood: x / Protein: 100 mg/dL / Nitrite: POSITIVE   Leuk Esterase: Large / RBC: < 5 /HPF / WBC Many /HPF   Sq Epi: x / Non Sq Epi: 0-5 /HPF / Bacteria: Present /HPF      CAPILLARY BLOOD GLUCOSE      POCT Blood Glucose.: 148 mg/dL (15 Sep 2020 19:12)      RADIOLOGY & ADDITIONAL TESTS: Reviewed.

## 2020-09-17 NOTE — CONSULT NOTE ADULT - SUBJECTIVE AND OBJECTIVE BOX
JOSE RAMON HAYES          MRN-[id number]            (10/18/1932)    HPI:  87M w/ PMHx of mild dementia, CAD/MI (s/p CABG x4 in ), a-fib on Eliquis, HTN, HLD, pre-DM, rheumatoid arthritis not on Rx, prostate cancer, recent hospitalization for sepsis/pna (2020 at [facility]), BIBA from home c/o witnessed coffee ground emesis x1 episode preceded by coughing fit. HHA noted that patient was exercising at home w/ complaints of nausea, abdominal pain and coughing fit which preceded one episode of coffee ground emesis. She called 911 and brought the patient to ED. Other preceding symptoms unknown as HHA not at bedside during interview and son with minimal information regarding patient's care. CT abd/pelvis showed hydroureter and L 4mm nonobstructive intrarenal stone. Plan for EGD tomorrow ()      PAST MEDICAL & SURGICAL HISTORY:  Rheumatoid arthritis  HLD (hyperlipidemia)  Atrial fibrillation  Falls frequently  Hypertension  Prostate cancer  Depression  Osteopenia  Diverticulitis  CAD (coronary artery disease)  Dementia  S/P CABG (coronary artery bypass graft)    FAMILY HISTORY:  No pertinent family history in first degree relatives    Reviewed and found non contributory in mother or father    SOCIAL HISTORY: Lives in an apartment building with a 24 hour HHA. He is , and has a son and daughter.     ROS:                      Dyspnea (Madiha 0-10): 0                      N/V (N):                              Secretions (N) :                Agitation(N):   Pain (N):       Painad 0  -Provocation/Palliation: n/a  -Quality/Quantity: n/a  -Radiating: n/a  -Severity: n/a  -Timing/Frequency: n/a  -Impact on ADLs: none    General:  Denied  HEENT:    Denied  Neck:  Denied  CVS:  Denied  Resp:  Denied  GI:  n/v that has resolved, no longer complaining of coffee ground emesis     :  Denied  Musc:  Denied  Neuro:  Denied  Psych:  Denied  Skin:  Denied  Lymph:  Denied    Allergies    No Known Allergies    Intolerances    predniSONE (Stomach Upset)    Opiate Naive (Y):   -iStop reviewed (Y/N): Yes. No Rx found on iStop review (Ref#:           )    Medications:      MEDICATIONS  (STANDING):  cefTRIAXone   IVPB 1000 milliGRAM(s) IV Intermittent every 24 hours  latanoprost 0.005% Ophthalmic Solution 1 Drop(s) Both EYES at bedtime  melatonin 1 milliGRAM(s) Oral at bedtime  multivitamin 1 Tablet(s) Oral daily  pantoprazole  Injectable 40 milliGRAM(s) IV Push every 12 hours  sertraline 25 milliGRAM(s) Oral daily    MEDICATIONS  (PRN):      Labs:    CBC:                        10.7   10. )-----------( 222      ( 17 Sep 2020 06:33 )             34.6     CMP:        139  |  105  |  21  ----------------------------<  133<H>  4.0   |  24  |  0.96    Ca    8.9      17 Sep 2020 06:33  Phos  2.6       Mg     2.0         TPro  7.9  /  Alb  3.9  /  TBili  0.6  /  DBili  x   /  AST  33  /  ALT  21  /  AlkPhos  92  09-15   Albumin, Serum: 3.9 g/dL (09-15-20 @ 19:20)    PT/INR - ( 16 Sep 2020 06:51 )   PT: 17.6 sec;   INR: 1.50          PTT - ( 16 Sep 2020 06:51 )  PTT:28.8 sec   Urinalysis Basic - ( 15 Sep 2020 22:36 )    Color: Yellow / Appearance: Clear / S.025 / pH: x  Gluc: x / Ketone: NEGATIVE  / Bili: Negative / Urobili: 0.2 E.U./dL   Blood: x / Protein: 100 mg/dL / Nitrite: POSITIVE   Leuk Esterase: Large / RBC: < 5 /HPF / WBC Many /HPF   Sq Epi: x / Non Sq Epi: 0-5 /HPF / Bacteria: Present /HPF      Imaging:  Reviewed    CT Abdomen and Pelvis No Cont (09.15.20 @ 23:52) >  FINDINGS:    Evaluation of the abdominopelvic viscera is limited due to noncontrast technique.    Lower chest: Few bandlike opacities at both lung bases probably subsegmental atelectasis or scar.. Cardiomegaly. Large hiatal hernia. Aortic valve and annular calcifications. Partially imaged median sternotomy.  Liver: Unremarkable..  Biliary system: Cholelithiasis. No biliary dilatation.  Pancreas: Scattered punctate calcifications throughout the body and tail, which could represent sequelae of chronic pancreatitis. No evidence of acute inflammatory changes.  Spleen: Unremarkable.  Adrenal glands: Unremarkable.  Kidneys: Left hydroureter with periureteralfat stranding without evidence of calcified ureteral stone. No hydronephrosis. Two nonobstructing calculi right kidney measuring 0.1-0.2 cm. Two nonobstructing left-sided intrarenal stones, 0.2-0.4 cm. Subcentimeter right kidney exophytic cyst.  Nohydronephrosis. No renal or ureteral calculus.  Urinary Bladder: Mild circumferential wall thickening. No bladder calculi.  Reproductive organs: Brachytherapy seeds are seen within the prostate.  Bowel/Peritoneum: No bowel obstruction. Colonic diverticulosis. Appendix not definitely visualized absent pericecal inflammatory changes.  Lymph nodes: No lymphadenopathy.  Aorta/IVC: No aneurysm. Abundant aortoiliac atherosclerotic calcifications including ostia celiac trunk, SMA and bilateralrenal arteries.  Abdominal wall: Small fat-containing umbilical hernia. Small fat containing left inguinal hernia.  Bones/Soft tissues: No suspicious osteolytic or blastic lesion. 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    IMPRESSION:  1.  Mild left hydroureter with periureteral and perinephric stranding suggesting inflammation in the absence of an obstructing stone, possibly recently passed stone or ureteritis/pyelitis. No hydronephrosis.  2.  Mild circumferential bladder wall thickening, possibly cystitis.  3.  Nonobstructing punctate bilateral renal calculi.  4.  Large hiatal hernia containing majority of stomach.  5.  Cholelithiasis.  6.  Diverticulosis.  7.  Abundant aortoiliac atherosclerotic calcifications including ostia celiac trunk, SMA and bilateral renal arteries.    Xray Chest 1 View-PORTABLE IMMEDIATE (Xray Chest 1 View-PORTABLE IMMEDIATE .) (09.15.20 @ 19:29) >    A portable frontal view of the chest is compared to the prior study dated 2018. Heart size is enlarged. Retrocardiac opacity is seen suggestive of large hiatal hernia. No pulmonary consolidation is seen. No pleural effusion or pneumothorax. Median sternotomy. Old right rib fractures.      IMPRESSION: Large hiatal hernia. Cardiomegaly.      PEx:  T(C): 36.7 (20 @ 05:32), Max: 36.8 (20 @ 20:37)  HR: 84 (20 @ 05:32) (78 - 87)  BP: 147/78 (20 @ 05:32) (101/60 - 147/78)  RR: 16 (20 @ 05:32) (16 - 18)  SpO2: 97% (20 @ 05:32) (95% - 97%)  Wt(kg): --  Daily     Daily Weight in k.9 (16 Sep 2020 13:22)    General: elderly male, laying in bed comfortably. Responds to questions, making sarcastic remarks  HEENT:    Neck:   CVS:   Resp:   GI:    :    Musc:     Neuro:   Psych:     Skin:  Lymph:  Preadmit Karnofsky:  %50           Current Karnofsky:     %60-50  Cachexia (N):   BMI: 18.8    Advanced Directives:     DNR with trial of non-invasive ventilation     MOLST scanned into alpha     HCP:     Decision maker: The patient is able to participate in complex medical decision making conversations.   Legal surrogate: Sonsabrina    GOALS OF CARE DISCUSSION       Palliative care info provided 	           Advanced Directives addressed please see Advance Care Planning Note	           Documentation of GOC:	    REFERRALS	        Palliative Med  - not offered at this time       Unit SW/Case Mgmt- not offered at this time        - not offered at this time       Speech/Swallow - not offered at this time       Patient/Family Support - not offered at this time       Massage Therapy - not offered at this time       Music Therapy - not offered at this time       Pet Therapy - not offered at this time       Hospice - not offered at this time       Nutrition consulted       Dietician consulted       PT/OT consulted JOSE RAMON HAYES          MRN-[id number]            (10/18/1932)    HPI:  87M w/ PMHx of mild dementia, CAD/MI (s/p CABG x4 in ), a-fib on Eliquis, HTN, HLD, pre-DM, rheumatoid arthritis not on Rx, prostate cancer, recent hospitalization for sepsis/pna (2020 at [facility]), BIBA from home c/o witnessed coffee ground emesis x1 episode preceded by coughing fit. HHA noted that patient was exercising at home w/ complaints of nausea, abdominal pain and coughing fit which preceded one episode of coffee ground emesis. She called 911 and brought the patient to ED. Other preceding symptoms unknown as HHA not at bedside during interview and son with minimal information regarding patient's care. CT abd/pelvis showed hydroureter and L 4mm nonobstructive intrarenal stone. Plan for EGD tomorrow ()      PAST MEDICAL & SURGICAL HISTORY:  Rheumatoid arthritis  HLD (hyperlipidemia)  Atrial fibrillation  Falls frequently  Hypertension  Prostate cancer  Depression  Osteopenia  Diverticulitis  CAD (coronary artery disease)  Dementia  S/P CABG (coronary artery bypass graft)    FAMILY HISTORY:  No pertinent family history in first degree relatives    Reviewed and found non contributory in mother or father    SOCIAL HISTORY: Lives in an apartment building with a 24 hour HHA. He is , and has a son and daughter.     ROS:                      Dyspnea (Madiha 0-10): 0                      N/V (N):                              Secretions (N) :                Agitation(N):   Pain (N):       Painad 0  -Provocation/Palliation: n/a  -Quality/Quantity: n/a  -Radiating: n/a  -Severity: n/a  -Timing/Frequency: n/a  -Impact on ADLs: none    General:  Denied  HEENT:    Denied  Neck:  Denied  CVS:  Denied  Resp:  Denied  GI:  n/v that has resolved, no longer complaining of coffee ground emesis     :  Denied  Musc:  Denied  Neuro:  Denied  Psych:  Denied  Skin:  Denied  Lymph:  Denied    Allergies    No Known Allergies    Intolerances    predniSONE (Stomach Upset)    Opiate Naive (Y):   -iStop reviewed (Y/N): Yes. No Rx found on iStop review (Ref#:           )    Medications:      MEDICATIONS  (STANDING):  cefTRIAXone   IVPB 1000 milliGRAM(s) IV Intermittent every 24 hours  latanoprost 0.005% Ophthalmic Solution 1 Drop(s) Both EYES at bedtime  melatonin 1 milliGRAM(s) Oral at bedtime  multivitamin 1 Tablet(s) Oral daily  pantoprazole  Injectable 40 milliGRAM(s) IV Push every 12 hours  sertraline 25 milliGRAM(s) Oral daily    MEDICATIONS  (PRN):      Labs:    CBC:                        10.7   10. )-----------( 222      ( 17 Sep 2020 06:33 )             34.6     CMP:        139  |  105  |  21  ----------------------------<  133<H>  4.0   |  24  |  0.96    Ca    8.9      17 Sep 2020 06:33  Phos  2.6       Mg     2.0         TPro  7.9  /  Alb  3.9  /  TBili  0.6  /  DBili  x   /  AST  33  /  ALT  21  /  AlkPhos  92  09-15   Albumin, Serum: 3.9 g/dL (09-15-20 @ 19:20)    PT/INR - ( 16 Sep 2020 06:51 )   PT: 17.6 sec;   INR: 1.50          PTT - ( 16 Sep 2020 06:51 )  PTT:28.8 sec   Urinalysis Basic - ( 15 Sep 2020 22:36 )    Color: Yellow / Appearance: Clear / S.025 / pH: x  Gluc: x / Ketone: NEGATIVE  / Bili: Negative / Urobili: 0.2 E.U./dL   Blood: x / Protein: 100 mg/dL / Nitrite: POSITIVE   Leuk Esterase: Large / RBC: < 5 /HPF / WBC Many /HPF   Sq Epi: x / Non Sq Epi: 0-5 /HPF / Bacteria: Present /HPF      Imaging:  Reviewed    CT Abdomen and Pelvis No Cont (09.15.20 @ 23:52) >  FINDINGS:    Evaluation of the abdominopelvic viscera is limited due to noncontrast technique.    Lower chest: Few bandlike opacities at both lung bases probably subsegmental atelectasis or scar.. Cardiomegaly. Large hiatal hernia. Aortic valve and annular calcifications. Partially imaged median sternotomy.  Liver: Unremarkable..  Biliary system: Cholelithiasis. No biliary dilatation.  Pancreas: Scattered punctate calcifications throughout the body and tail, which could represent sequelae of chronic pancreatitis. No evidence of acute inflammatory changes.  Spleen: Unremarkable.  Adrenal glands: Unremarkable.  Kidneys: Left hydroureter with periureteralfat stranding without evidence of calcified ureteral stone. No hydronephrosis. Two nonobstructing calculi right kidney measuring 0.1-0.2 cm. Two nonobstructing left-sided intrarenal stones, 0.2-0.4 cm. Subcentimeter right kidney exophytic cyst.  Nohydronephrosis. No renal or ureteral calculus.  Urinary Bladder: Mild circumferential wall thickening. No bladder calculi.  Reproductive organs: Brachytherapy seeds are seen within the prostate.  Bowel/Peritoneum: No bowel obstruction. Colonic diverticulosis. Appendix not definitely visualized absent pericecal inflammatory changes.  Lymph nodes: No lymphadenopathy.  Aorta/IVC: No aneurysm. Abundant aortoiliac atherosclerotic calcifications including ostia celiac trunk, SMA and bilateralrenal arteries.  Abdominal wall: Small fat-containing umbilical hernia. Small fat containing left inguinal hernia.  Bones/Soft tissues: No suspicious osteolytic or blastic lesion. 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    IMPRESSION:  1.  Mild left hydroureter with periureteral and perinephric stranding suggesting inflammation in the absence of an obstructing stone, possibly recently passed stone or ureteritis/pyelitis. No hydronephrosis.  2.  Mild circumferential bladder wall thickening, possibly cystitis.  3.  Nonobstructing punctate bilateral renal calculi.  4.  Large hiatal hernia containing majority of stomach.  5.  Cholelithiasis.  6.  Diverticulosis.  7.  Abundant aortoiliac atherosclerotic calcifications including ostia celiac trunk, SMA and bilateral renal arteries.    Xray Chest 1 View-PORTABLE IMMEDIATE (Xray Chest 1 View-PORTABLE IMMEDIATE .) (09.15.20 @ 19:29) >    A portable frontal view of the chest is compared to the prior study dated 2018. Heart size is enlarged. Retrocardiac opacity is seen suggestive of large hiatal hernia. No pulmonary consolidation is seen. No pleural effusion or pneumothorax. Median sternotomy. Old right rib fractures.      IMPRESSION: Large hiatal hernia. Cardiomegaly.      PEx:  T(C): 36.7 (20 @ 05:32), Max: 36.8 (20 @ 20:37)  HR: 84 (20 @ 05:32) (78 - 87)  BP: 147/78 (20 @ 05:32) (101/60 - 147/78)  RR: 16 (20 @ 05:32) (16 - 18)  SpO2: 97% (20 @ 05:32) (95% - 97%)  Wt(kg): --  Daily     Daily Weight in k.9 (16 Sep 2020 13:22)    General: thin elderly male, laying in bed comfortably. Responds to questions, making sarcastic remarks  HEENT: non-icteric sclera, moist mucous membranes  Neck:   CVS: irregularly irregular  Resp: non-labored breathing   GI: non-distended, non-tender to palpation  : not assessed  Neuro: AAOx2, no focal deficits, deconditioning but no focal weakness, difficulty answering questions correctly   Psych: normal mood and affect.      Skin: no rashes appreciated  Lymph: not assessed at this time  Preadmit Karnofsky:  %50           Current Karnofsky:     %60-50  Cachexia (N):   BMI: 18.8    Advanced Directives:     DNR with trial of non-invasive ventilation     MOLST scanned into alpha     HCP: Ranjith Almanza    Decision maker: The patient is able to participate in complex medical decision making conversations.     GOALS OF CARE DISCUSSION       Palliative care info provided 	    	           Documentation of GOC:	    REFERRALS	        Nutrition consulted       Dietician consulted       PT/OT consulted

## 2020-09-17 NOTE — DISCHARGE NOTE PROVIDER - HOSPITAL COURSE
#Discharge: do not delete    Patient is 86 yo M with past medical history of mild dementia, CAD/MI s/p CABG 2010, afib on eliquis, pre-DM, RA who presented after an episode of coffee ground emesis found to have urosepsis and ______ on EGD.     Problem List/Main Diagnoses (system-based):   #UGIB    #urosepsis  met severe sepsis criteria w/ elevated lactate  ucx growing     #afib on eliquis    #CAD    #pre-DM    #RA    Inpatient treatment course:   New medications:   Labs to be followed outpatient:   Exam to be followed outpatient:    #Discharge: do not delete    Patient is 86 yo M with past medical history of mild dementia, CAD/MI s/p CABG 2010, afib on eliquis, pre-DM, RA, prostate cancer s/p brachytherapy who presented after an episode of coffee ground emesis found to have urosepsis and hiatal hernia on EGD.     Problem List/Main Diagnoses (system-based):     #hematemesis  EGD with findings of large hiatal hernia, gastropathy, no active inflammation or bleeding  noted to have similar episode in 2018 and treated with PPI  per GI recs, resumed regular diet and PPI daily    #urosepsis  met severe sepsis criteria w/ elevated lactate  ucx growing GNRs  treated with IV ceftriaxone  discharged on PO cefpodoxime to complete 10 day course    #afib on eliquis  eliquis initially held for c/f UGIB, restarted given no bleeding found and Hg stable    #CAD  patient not on aspirin for unclear reason  follow up with primary care  no statin needed due to age    New medications: cefpodoxime   #Discharge: do not delete    Patient is 86 yo M with past medical history of mild dementia, CAD/MI s/p CABG 2010, afib on eliquis, pre-DM, RA, prostate cancer s/p brachytherapy who presented after an episode of coffee ground emesis found to have urosepsis and hiatal hernia on EGD.     Problem List/Main Diagnoses (system-based):     #hematemesis  EGD with findings of large hiatal hernia, gastropathy, no active inflammation or bleeding  noted to have similar episode in 2018 and treated with PPI  per GI recs, resumed regular diet and PPI daily    #urosepsis  met severe sepsis criteria w/ elevated lactate  ucx growing GNRs  treated with IV ceftriaxone  discharged on PO cefpodoxime to complete 10 day course    #afib on eliquis  eliquis initially held for c/f UGIB, restarted given no bleeding found and Hg stable    #CAD  patient not on aspirin for unclear reason  follow up with primary care  no statin needed due to age    New medications: cefpodoxime  Exams to be followed: None

## 2020-09-17 NOTE — CONSULT NOTE ADULT - PROBLEM SELECTOR RECOMMENDATION 4
- Patient with MOLST in chart.   - DNR/DNI. Trial of non invasive ventilation noted in chart.  - HCP in chart.   - Disposition likely to be home with HHA.   - Patient does not meet criteria for hospice services.

## 2020-09-17 NOTE — DISCHARGE NOTE PROVIDER - CARE PROVIDER_API CALL
Your, PCP  Phone: (   )    -  Fax: (   )    -  Follow Up Time:    Erica Espitia  INTERNAL MEDICINE  [address], [place] [address]  Phone: [phone]  Fax: [phone]  Follow Up Time: 2 weeks

## 2020-09-17 NOTE — PROGRESS NOTE ADULT - PROBLEM SELECTOR PLAN 4
H/o paroxysmal a-fib on Eliquis. EKG on admission with sinus tachycardia.  - Holding home Eliquis for possible GIB as above
H/o paroxysmal a-fib on Eliquis. EKG on admission with sinus tachycardia.  - Holding home Eliquis for possible GIB as above

## 2020-09-17 NOTE — PROGRESS NOTE ADULT - PROBLEM SELECTOR PLAN 3
Pt septic with positive UA. Per son at bedside, pt had prior h/o kidney stones 8-10 years ago which pt passed on his own. Had a prior episode of urosepsis 2 years ago. CT abd/pelvis noncon on admission with no hydronephrosis, a nonobstructive 4mm stone in the L kidney, and a mildly dilated L ureter with possible pyelonephritis. Received CFTX in the ED.  - C/w Ceftriaxone 1 gram q24hr x 10 days  - F/u urine culture  - Bladder scan to assess for urinary retention, straight cath prn for >400-500 cc Pt septic with positive UA. Per son at bedside, pt had prior h/o kidney stones 8-10 years ago which pt passed on his own. Had a prior episode of urosepsis 2 years ago. CT abd/pelvis noncon on admission with no hydronephrosis, a nonobstructive 4mm stone in the L kidney, and a mildly dilated L ureter with possible pyelonephritis. Received CFTX in the ED.  - C/w Ceftriaxone 1 gram q24hr x 10 days  - F/u urine culture- >100K GNR  - Bladder scan to assess for urinary retention, straight cath prn for >400-500 cc

## 2020-09-17 NOTE — PROGRESS NOTE ADULT - PROBLEM SELECTOR PLAN 10
F: s/p 1L NS bolus  E: replete prn  N: NPO  DVT ppx: SCDs  GI ppx: pantoprazole 40mg BID    - f/u PT  Code: DNR but not DNI  Dispo: [facility] F: s/p 1L NS bolus  E: replete prn  N: NPO  DVT ppx: SCDs  GI ppx: pantoprazole 40mg BID  Code: DNR but not DNI  Dispo: [facility] F: s/p 1L NS bolus  E: replete prn  N: NPO  DVT ppx: SCDs  GI ppx: pantoprazole 40mg BID  Code: DNR but not DNI  Dispo: PT recommends [facility]

## 2020-09-17 NOTE — PROGRESS NOTE ADULT - PROBLEM SELECTOR PLAN 7
H/o HLD no longer on a statin.  - Continue to monitor
H/o HLD no longer on a statin.  - Continue to monitor

## 2020-09-17 NOTE — PROGRESS NOTE ADULT - PROBLEM SELECTOR PLAN 1
Pt with reported episode of workout induced nausea, followed by coughing fit and one episode of coffee ground emesis at home. No further episodes in the ED. Pt tachycardic and hypotensive in setting of sepsis, responded well to IVF. CBC downtrended from 12.4 to 10.8 likely s/p hemodilution from IVF. Reticulocyte % hypoproliferative. CT revealed large hiatal hernia that is non-strangulated and reducible. Sx likely due to increased intraabdominal pressure from working out. Patient had similar episode of coffee ground emesis in 2018 treated with PPI bid x 1 month.  S/p IV pantoprazole 80mg and drip in the ED.  - C/w IV pantoprazole 40mg BID  - Holding home Eliquis  - Keep active T&S and large bore IVs x2  - GI will possibly scope today    ADDENDUM:  #anemia: monitor cbc , plan as above, NPO pending GI evaluation Pt with reported episode of workout induced nausea, followed by coughing fit and one episode of coffee ground emesis at home. No further episodes in the ED. Pt tachycardic and hypotensive in setting of sepsis, responded well to IVF. CBC downtrended from 12.4 to 10.8 likely s/p hemodilution from IVF. Reticulocyte % hypoproliferative. CT revealed large hiatal hernia that is non-strangulated and reducible. Sx likely due to increased intraabdominal pressure from working out. Patient had similar episode of coffee ground emesis in 2018 treated with PPI bid x 1 month.  S/p IV pantoprazole 80mg and drip in the ED.  - C/w IV pantoprazole 40mg BID  - Holding home Eliquis  - Keep active T&S and large bore IVs x2  - GI will possibly scope today    #Anemia  -monitor CBC  -pending GI eval today Pt with reported episode of workout induced nausea, followed by coughing fit and one episode of coffee ground emesis at home. No further episodes in the ED. Pt tachycardic and hypotensive in setting of sepsis, responded well to IVF. CBC downtrended from 12.4 to 10.8 likely s/p hemodilution from IVF. Reticulocyte % hypoproliferative. CT revealed large hiatal hernia that is non-strangulated and reducible. Sx likely due to increased intraabdominal pressure from working out. Patient had similar episode of coffee ground emesis in 2018 treated with PPI bid x 1 month.  S/p IV pantoprazole 80mg and drip in the ED.  - C/w IV pantoprazole 40mg BID  - Holding home Eliquis  - Keep active T&S and large bore IVs x2  - GI will scope today    #Anemia  -monitor CBC  -pending GI eval today

## 2020-09-17 NOTE — PROGRESS NOTE ADULT - PROBLEM SELECTOR PLAN 2
Elevated lactate (2.8 improved to 1.6), troponin (0.03), BNP (1278) indicate mild end organ dysfunction. T 101.4 improved to 98.8.,  improved to 87-99 , WBC 14.8 improved to 12. 39 with UA positive for infection, CT abd/pelvis with possible L pyelonephritis. S/p 500ml NS boluses x2 in the ED, x1 on the floor. Given IV Ceftriaxone.   - F/u blood cultures  - F/u urine cultures  - treatment w ceftriaxone 1g q24 x 10 days Elevated lactate (2.8 improved to 1.6), troponin (0.03), BNP (1278) indicate mild end organ dysfunction. T 101.4 improved to 98.8.,  improved to 87-99 , WBC 14.8 improved to 12. 39 with UA positive for infection, CT abd/pelvis with possible L pyelonephritis. S/p 500ml NS boluses x2 in the ED, x1 on the floor. Given IV Ceftriaxone.   - F/u blood cultures-NGTD  - F/u urine cultures-NGTD  - c/w treatment with ceftriaxone as below Elevated lactate (2.8 improved to 1.6), troponin (0.03), BNP (1278) indicate mild end organ dysfunction. T 101.4 improved to 98.8.,  improved to 87-99 , WBC 14.8 improved to 12. 39 with UA positive for infection, CT abd/pelvis with possible L pyelonephritis. S/p 500ml NS boluses x2 in the ED, x1 on the floor. Given IV Ceftriaxone.   - F/u blood cultures-NGTD  - F/u urine cultures- awaiting sensitivities   - c/w treatment with ceftriaxone as below Elevated lactate (2.8 improved to 1.6), troponin (0.03), BNP (1278) indicate mild end organ dysfunction. T 101.4 improved to 98.8.,  improved to 87-99 , WBC 14.8 improved to 12. 39 with UA positive for infection, CT abd/pelvis with possible L pyelonephritis. S/p 500ml NS boluses x2 in the ED, x1 on the floor. Given IV Ceftriaxone.   - F/u blood cultures-NGTD  - F/u urine cultures-GNR awaiting sensitivities   - c/w treatment with ceftriaxone as below

## 2020-09-17 NOTE — CONSULT NOTE ADULT - PROBLEM SELECTOR RECOMMENDATION 9
- Patient with moderate dementia at baseline.  - Difficult to ascertain given sepsis, GIB.   - Recommend to decrease unnecessary stimuli, anticholinergic medications.  - No evidence of urinary retention or constipation.

## 2020-09-17 NOTE — CHART NOTE - NSCHARTNOTEFT_GEN_A_CORE
GI Procedure Note:    Patient had an endoscopic evaluation today with the following findings:    impressions:	1. Large (9cm) hiatal hernia  2. Retained food in stomach. Nodular gastric mucosa likely from chronic gastropathy           recommendations:	1. Resume diet  2. PPI daily x 4 weeks               Recommendations discussed with primary team  Plan discussed with GI service attending    Arash Moreno MD  PGY-4 GI fellow  Pager: [phone]                  Please check paper chart for further details regarding procedure, findings, and interventions and recommendations.

## 2020-09-17 NOTE — CONSULT NOTE ADULT - ASSESSMENT
per Internal Medicine    87M w/ PMHx of mild dementia, CAD/MI (s/p CABG x4 in 2010), a-fib on Eliquis, HTN, HLD, pre-DM, rheumatoid arthritis not on Rx, prostate cancer, recent hospitalization for sepsis/pna (1/2020 at [facility]), BIBA from home c/o witnessed coffee ground emesis x1 episode preceded by coughing fit, admitted for r/o GIB and urosepsis.  · Nutritional Assessment  This patient has been assessed with a concern for Malnutrition and has been determined to have a diagnosis/diagnoses of Moderate protein-calorie malnutrition and Underweight/BMI < 19.    This patient is being managed with:   Diet NPO after Midnight-     NPO Start Date: 16-Sep-2020   NPO Start Time: 23:59  Except Medications  Entered: Sep 16 2020  4:58PM    Diet Clear Liquid-  Entered: Sep 16 2020  4:35PM      Problem/Plan - 1:  ·  Problem: Upper GI bleed.  Plan: Pt with reported episode of workout induced nausea, followed by coughing fit and one episode of coffee ground emesis at home. No further episodes in the ED. Pt tachycardic and hypotensive in setting of sepsis, responded well to IVF. CBC downtrended from 12.4 to 10.8 likely s/p hemodilution from IVF. Reticulocyte % hypoproliferative. CT revealed large hiatal hernia that is non-strangulated and reducible. Sx likely due to increased intraabdominal pressure from working out. Patient had similar episode of coffee ground emesis in 2018 treated with PPI bid x 1 month.  S/p IV pantoprazole 80mg and drip in the ED.  - C/w IV pantoprazole 40mg BID  - Holding home Eliquis  - Keep active T&S and large bore IVs x2  - GI will possibly scope today    #Anemia  -monitor CBC  -pending GI eval today.    Problem/Plan - 2:  ·  Problem: Sepsis with multiple organ dysfunction (MOD).  Plan: Elevated lactate (2.8 improved to 1.6), troponin (0.03), BNP (1278) indicate mild end organ dysfunction. T 101.4 improved to 98.8.,  improved to 87-99 , WBC 14.8 improved to 12. 39 with UA positive for infection, CT abd/pelvis with possible L pyelonephritis. S/p 500ml NS boluses x2 in the ED, x1 on the floor. Given IV Ceftriaxone.   - F/u blood cultures-NGTD  - F/u urine cultures-NGTD  - c/w treatment with ceftriaxone as below.    Problem/Plan - 3:  ·  Problem: Urinary tract infection without hematuria, site unspecified.  Plan: Pt septic with positive UA. Per son at bedside, pt had prior h/o kidney stones 8-10 years ago which pt passed on his own. Had a prior episode of urosepsis 2 years ago. CT abd/pelvis noncon on admission with no hydronephrosis, a nonobstructive 4mm stone in the L kidney, and a mildly dilated L ureter with possible pyelonephritis. Received CFTX in the ED.  - C/w Ceftriaxone 1 gram q24hr x 10 days  - F/u urine culture  - Bladder scan to assess for urinary retention, straight cath prn for >400-500 cc.     Problem/Plan - 4:  ·  Problem: Paroxysmal atrial fibrillation.  Plan: H/o paroxysmal a-fib on Eliquis. EKG on admission with sinus tachycardia.  - Holding home Eliquis for possible GIB as above.     Problem/Plan - 5:  ·  Problem: Benign hypertension.  Plan: /81 improved to 127/76. No hx of HTN medication.     Problem/Plan - 6:  Problem: Coronary artery disease involving native coronary artery of native heart without angina pectoris. Plan: H/o CAD/MI s/p CABG x4 in 2010. Previously on aspirin 81mg and atorvastatin 40mg in 2018 however no longer taking.  - Continue to monitor.    Problem/Plan - 7:  ·  Problem: Hyperlipidemia, unspecified hyperlipidemia type.  Plan: H/o HLD no longer on a statin.  - Continue to monitor.     Problem/Plan - 8:  ·  Problem: Rheumatoid arthritis, involving unspecified site, unspecified rheumatoid factor presence.  Plan: H/o RA, unknown seropositivity or presence of erosive disease, not on medication.  - Continue to monitor.     Problem/Plan - 9:  ·  Problem: Prostate cancer.  Plan: H/o prostate ca s/p brachytherapy. No active chemo/radiation.  - Continue to monitor.     Problem/Plan - 10:  Problem: Prophylactic measure. Plan; F: s/p 1L NS bolus  E: replete prn  N: NPO  DVT ppx: SCDs  GI ppx: pantoprazole 40mg BID  Code: DNR but not DNI

## 2020-09-17 NOTE — PROGRESS NOTE ADULT - ATTENDING COMMENTS
Dispo: [facility], if patient/daughter do not want [facility] then home PT with 24 hour care   After EGD, patient ,medically ready for DC Dispo: [facility], if patient/daughter do not want [facility] then home PT with 24 hour care   After EGD, patient ,medically ready for DC    #Moderate protein-calorie malnutrition   #Underweight/BMI < 19

## 2020-09-17 NOTE — PROGRESS NOTE ADULT - ASSESSMENT
87M w/ PMHx of mild dementia, CAD/MI (s/p CABG x4 in 2010), a-fib on Eliquis, HTN, HLD, pre-DM, rheumatoid arthritis not on Rx, prostate cancer, recent hospitalization for sepsis/pna (1/2020 at [facility]), BIBA from home c/o witnessed coffee ground emesis x1 episode preceded by coughing fit, admitted for r/o GIB and urosepsis.
87M w/ PMHx of mild dementia, CAD/MI (s/p CABG x4 in 2010), a-fib on Eliquis, HTN, HLD, pre-DM, rheumatoid arthritis not on Rx, prostate cancer, recent hospitalization for sepsis/pna (1/2020 at [facility]), BIBA from home c/o witnessed coffee ground emesis x1 episode preceded by coughing fit, admitted for r/o GIB and urosepsis.

## 2020-09-17 NOTE — PROGRESS NOTE ADULT - PROBLEM SELECTOR PLAN 6
H/o CAD/MI s/p CABG x4 in 2010. Previously on aspirin 81mg and atorvastatin 40mg in 2018 however no longer taking.  - Continue to monitor
H/o CAD/MI s/p CABG x4 in 2010. Previously on aspirin 81mg and atorvastatin 40mg in 2018 however no longer taking.  - Continue to monitor

## 2020-09-17 NOTE — DISCHARGE NOTE PROVIDER - PROVIDER TOKENS
FREE:[LAST:[Your],FIRST:[PCP],PHONE:[(   )    -],FAX:[(   )    -]] PROVIDER:[TOKEN:[[id number]],FOLLOWUP:[2 weeks]]

## 2020-09-17 NOTE — PROGRESS NOTE ADULT - PROBLEM SELECTOR PLAN 5
/81 improved to 127/76. No hx of HTN medication. /81 improved to 127/76. No hx of HTN medication.  Will continue to monitor

## 2020-09-17 NOTE — PROGRESS NOTE ADULT - PROBLEM SELECTOR PLAN 9
H/o prostate ca s/p brachytherapy. No active chemo/radiation.  - Continue to monitor
H/o prostate ca s/p brachytherapy. No active chemo/radiation.  - Continue to monitor

## 2020-09-18 ENCOUNTER — TRANSCRIPTION ENCOUNTER (OUTPATIENT)
Age: 85
End: 2020-09-18

## 2020-09-18 VITALS
DIASTOLIC BLOOD PRESSURE: 78 MMHG | HEART RATE: 87 BPM | RESPIRATION RATE: 18 BRPM | TEMPERATURE: 98 F | OXYGEN SATURATION: 95 % | SYSTOLIC BLOOD PRESSURE: 128 MMHG

## 2020-09-18 LAB
-  AMPICILLIN/SULBACTAM: SIGNIFICANT CHANGE UP
-  AMPICILLIN: SIGNIFICANT CHANGE UP
-  CEFAZOLIN: SIGNIFICANT CHANGE UP
-  CEFTRIAXONE: SIGNIFICANT CHANGE UP
-  CIPROFLOXACIN: SIGNIFICANT CHANGE UP
-  GENTAMICIN: SIGNIFICANT CHANGE UP
-  NITROFURANTOIN: SIGNIFICANT CHANGE UP
-  PIPERACILLIN/TAZOBACTAM: SIGNIFICANT CHANGE UP
-  TOBRAMYCIN: SIGNIFICANT CHANGE UP
-  TRIMETHOPRIM/SULFAMETHOXAZOLE: SIGNIFICANT CHANGE UP
ANION GAP SERPL CALC-SCNC: 11 MMOL/L — SIGNIFICANT CHANGE UP (ref 5–17)
BUN SERPL-MCNC: 22 MG/DL — SIGNIFICANT CHANGE UP (ref 7–23)
CALCIUM SERPL-MCNC: 9.1 MG/DL — SIGNIFICANT CHANGE UP (ref 8.4–10.5)
CHLORIDE SERPL-SCNC: 105 MMOL/L — SIGNIFICANT CHANGE UP (ref 96–108)
CO2 SERPL-SCNC: 23 MMOL/L — SIGNIFICANT CHANGE UP (ref 22–31)
CREAT SERPL-MCNC: 0.94 MG/DL — SIGNIFICANT CHANGE UP (ref 0.5–1.3)
CULTURE RESULTS: SIGNIFICANT CHANGE UP
GLUCOSE SERPL-MCNC: 135 MG/DL — HIGH (ref 70–99)
HCT VFR BLD CALC: 35.5 % — LOW (ref 39–50)
HGB BLD-MCNC: 10.7 G/DL — LOW (ref 13–17)
MAGNESIUM SERPL-MCNC: 1.9 MG/DL — SIGNIFICANT CHANGE UP (ref 1.6–2.6)
MCHC RBC-ENTMCNC: 23.9 PG — LOW (ref 27–34)
MCHC RBC-ENTMCNC: 30.1 GM/DL — LOW (ref 32–36)
MCV RBC AUTO: 79.4 FL — LOW (ref 80–100)
METHOD TYPE: SIGNIFICANT CHANGE UP
NRBC # BLD: 0 /100 WBCS — SIGNIFICANT CHANGE UP (ref 0–0)
ORGANISM # SPEC MICROSCOPIC CNT: SIGNIFICANT CHANGE UP
ORGANISM # SPEC MICROSCOPIC CNT: SIGNIFICANT CHANGE UP
PHOSPHATE SERPL-MCNC: 2.6 MG/DL — SIGNIFICANT CHANGE UP (ref 2.5–4.5)
PLATELET # BLD AUTO: 238 K/UL — SIGNIFICANT CHANGE UP (ref 150–400)
POTASSIUM SERPL-MCNC: 3.8 MMOL/L — SIGNIFICANT CHANGE UP (ref 3.5–5.3)
POTASSIUM SERPL-SCNC: 3.8 MMOL/L — SIGNIFICANT CHANGE UP (ref 3.5–5.3)
RBC # BLD: 4.47 M/UL — SIGNIFICANT CHANGE UP (ref 4.2–5.8)
RBC # FLD: 14 % — SIGNIFICANT CHANGE UP (ref 10.3–14.5)
SODIUM SERPL-SCNC: 139 MMOL/L — SIGNIFICANT CHANGE UP (ref 135–145)
SPECIMEN SOURCE: SIGNIFICANT CHANGE UP
WBC # BLD: 9.07 K/UL — SIGNIFICANT CHANGE UP (ref 3.8–10.5)
WBC # FLD AUTO: 9.07 K/UL — SIGNIFICANT CHANGE UP (ref 3.8–10.5)

## 2020-09-18 PROCEDURE — 71045 X-RAY EXAM CHEST 1 VIEW: CPT

## 2020-09-18 PROCEDURE — 83880 ASSAY OF NATRIURETIC PEPTIDE: CPT

## 2020-09-18 PROCEDURE — 86850 RBC ANTIBODY SCREEN: CPT

## 2020-09-18 PROCEDURE — 99285 EMERGENCY DEPT VISIT HI MDM: CPT | Mod: 25

## 2020-09-18 PROCEDURE — 97161 PT EVAL LOW COMPLEX 20 MIN: CPT

## 2020-09-18 PROCEDURE — 82550 ASSAY OF CK (CPK): CPT

## 2020-09-18 PROCEDURE — 86900 BLOOD TYPING SEROLOGIC ABO: CPT

## 2020-09-18 PROCEDURE — 83605 ASSAY OF LACTIC ACID: CPT

## 2020-09-18 PROCEDURE — 84484 ASSAY OF TROPONIN QUANT: CPT

## 2020-09-18 PROCEDURE — 86901 BLOOD TYPING SEROLOGIC RH(D): CPT

## 2020-09-18 PROCEDURE — 83690 ASSAY OF LIPASE: CPT

## 2020-09-18 PROCEDURE — 85027 COMPLETE CBC AUTOMATED: CPT

## 2020-09-18 PROCEDURE — 96375 TX/PRO/DX INJ NEW DRUG ADDON: CPT

## 2020-09-18 PROCEDURE — 85610 PROTHROMBIN TIME: CPT

## 2020-09-18 PROCEDURE — U0003: CPT

## 2020-09-18 PROCEDURE — 87186 SC STD MICRODIL/AGAR DIL: CPT

## 2020-09-18 PROCEDURE — 36415 COLL VENOUS BLD VENIPUNCTURE: CPT

## 2020-09-18 PROCEDURE — 87040 BLOOD CULTURE FOR BACTERIA: CPT

## 2020-09-18 PROCEDURE — 81001 URINALYSIS AUTO W/SCOPE: CPT

## 2020-09-18 PROCEDURE — 87086 URINE CULTURE/COLONY COUNT: CPT

## 2020-09-18 PROCEDURE — 84100 ASSAY OF PHOSPHORUS: CPT

## 2020-09-18 PROCEDURE — 80048 BASIC METABOLIC PNL TOTAL CA: CPT

## 2020-09-18 PROCEDURE — 74176 CT ABD & PELVIS W/O CONTRAST: CPT

## 2020-09-18 PROCEDURE — 82962 GLUCOSE BLOOD TEST: CPT

## 2020-09-18 PROCEDURE — 85025 COMPLETE CBC W/AUTO DIFF WBC: CPT

## 2020-09-18 PROCEDURE — 82553 CREATINE MB FRACTION: CPT

## 2020-09-18 PROCEDURE — 83735 ASSAY OF MAGNESIUM: CPT

## 2020-09-18 PROCEDURE — 99239 HOSP IP/OBS DSCHRG MGMT >30: CPT

## 2020-09-18 PROCEDURE — 85045 AUTOMATED RETICULOCYTE COUNT: CPT

## 2020-09-18 PROCEDURE — 96374 THER/PROPH/DIAG INJ IV PUSH: CPT

## 2020-09-18 PROCEDURE — 80053 COMPREHEN METABOLIC PANEL: CPT

## 2020-09-18 PROCEDURE — 85730 THROMBOPLASTIN TIME PARTIAL: CPT

## 2020-09-18 RX ORDER — CEFPODOXIME PROXETIL 100 MG
1 TABLET ORAL
Qty: 12 | Refills: 0
Start: 2020-09-18 | End: 2020-09-23

## 2020-09-18 RX ADMIN — PANTOPRAZOLE SODIUM 40 MILLIGRAM(S): 20 TABLET, DELAYED RELEASE ORAL at 05:56

## 2020-09-18 RX ADMIN — APIXABAN 5 MILLIGRAM(S): 2.5 TABLET, FILM COATED ORAL at 05:56

## 2020-09-18 NOTE — DISCHARGE NOTE NURSING/CASE MANAGEMENT/SOCIAL WORK - PATIENT PORTAL LINK FT
You can access the FollowMyHealth Patient Portal offered by [facility] by registering at the following website: [email or web address]. By joining [employer]’s FollowMyHealth portal, you will also be able to view your health information using other applications (apps) compatible with our system.

## 2020-09-19 ENCOUNTER — TRANSCRIPTION ENCOUNTER (OUTPATIENT)
Age: 85
End: 2020-09-19

## 2020-09-21 NOTE — ED ADULT NURSE NOTE - ADDITIONAL COMPLAINTS
Discharged per wheel chair in stable condition, moved self into car with assistance, released to care of daughter   Additional Complaints

## 2020-09-23 PROBLEM — I48.91 UNSPECIFIED ATRIAL FIBRILLATION: Chronic | Status: ACTIVE | Noted: 2020-09-16

## 2020-09-23 PROBLEM — M06.9 RHEUMATOID ARTHRITIS, UNSPECIFIED: Chronic | Status: ACTIVE | Noted: 2020-09-16

## 2020-09-23 PROBLEM — E78.5 HYPERLIPIDEMIA, UNSPECIFIED: Chronic | Status: ACTIVE | Noted: 2020-09-16

## 2020-09-25 ENCOUNTER — TRANSCRIPTION ENCOUNTER (OUTPATIENT)
Age: 85
End: 2020-09-25

## 2020-09-25 DIAGNOSIS — Z95.1 PRESENCE OF AORTOCORONARY BYPASS GRAFT: ICD-10-CM

## 2020-09-25 DIAGNOSIS — Z91.81 HISTORY OF FALLING: ICD-10-CM

## 2020-09-25 DIAGNOSIS — E78.5 HYPERLIPIDEMIA, UNSPECIFIED: ICD-10-CM

## 2020-09-25 DIAGNOSIS — G93.41 METABOLIC ENCEPHALOPATHY: ICD-10-CM

## 2020-09-25 DIAGNOSIS — N13.4 HYDROURETER: ICD-10-CM

## 2020-09-25 DIAGNOSIS — D64.9 ANEMIA, UNSPECIFIED: ICD-10-CM

## 2020-09-25 DIAGNOSIS — R73.03 PREDIABETES: ICD-10-CM

## 2020-09-25 DIAGNOSIS — G30.1 ALZHEIMER'S DISEASE WITH LATE ONSET: ICD-10-CM

## 2020-09-25 DIAGNOSIS — N13.2 HYDRONEPHROSIS WITH RENAL AND URETERAL CALCULOUS OBSTRUCTION: ICD-10-CM

## 2020-09-25 DIAGNOSIS — I25.2 OLD MYOCARDIAL INFARCTION: ICD-10-CM

## 2020-09-25 DIAGNOSIS — F02.80 DEMENTIA IN OTHER DISEASES CLASSIFIED ELSEWHERE, UNSPECIFIED SEVERITY, WITHOUT BEHAVIORAL DISTURBANCE, PSYCHOTIC DISTURBANCE, MOOD DISTURBANCE, AND ANXIETY: ICD-10-CM

## 2020-09-25 DIAGNOSIS — R65.20 SEVERE SEPSIS WITHOUT SEPTIC SHOCK: ICD-10-CM

## 2020-09-25 DIAGNOSIS — I48.0 PAROXYSMAL ATRIAL FIBRILLATION: ICD-10-CM

## 2020-09-25 DIAGNOSIS — N39.0 URINARY TRACT INFECTION, SITE NOT SPECIFIED: ICD-10-CM

## 2020-09-25 DIAGNOSIS — Z85.46 PERSONAL HISTORY OF MALIGNANT NEOPLASM OF PROSTATE: ICD-10-CM

## 2020-09-25 DIAGNOSIS — E44.0 MODERATE PROTEIN-CALORIE MALNUTRITION: ICD-10-CM

## 2020-09-25 DIAGNOSIS — M06.9 RHEUMATOID ARTHRITIS, UNSPECIFIED: ICD-10-CM

## 2020-09-25 DIAGNOSIS — K29.50 UNSPECIFIED CHRONIC GASTRITIS WITHOUT BLEEDING: ICD-10-CM

## 2020-09-25 DIAGNOSIS — K44.9 DIAPHRAGMATIC HERNIA WITHOUT OBSTRUCTION OR GANGRENE: ICD-10-CM

## 2020-09-25 DIAGNOSIS — A41.51 SEPSIS DUE TO ESCHERICHIA COLI [E. COLI]: ICD-10-CM

## 2020-09-25 DIAGNOSIS — I25.10 ATHEROSCLEROTIC HEART DISEASE OF NATIVE CORONARY ARTERY WITHOUT ANGINA PECTORIS: ICD-10-CM

## 2020-09-25 DIAGNOSIS — N40.0 BENIGN PROSTATIC HYPERPLASIA WITHOUT LOWER URINARY TRACT SYMPTOMS: ICD-10-CM

## 2020-09-25 DIAGNOSIS — I10 ESSENTIAL (PRIMARY) HYPERTENSION: ICD-10-CM

## 2020-09-25 DIAGNOSIS — R53.1 WEAKNESS: ICD-10-CM

## 2020-09-25 DIAGNOSIS — Z51.5 ENCOUNTER FOR PALLIATIVE CARE: ICD-10-CM

## 2020-09-25 DIAGNOSIS — K92.2 GASTROINTESTINAL HEMORRHAGE, UNSPECIFIED: ICD-10-CM

## 2020-10-16 ENCOUNTER — APPOINTMENT (OUTPATIENT)
Dept: INTERNAL MEDICINE | Facility: CLINIC | Age: 85
End: 2020-10-16
Payer: MEDICARE

## 2020-10-16 VITALS
SYSTOLIC BLOOD PRESSURE: 108 MMHG | TEMPERATURE: 97 F | DIASTOLIC BLOOD PRESSURE: 64 MMHG | HEART RATE: 80 BPM | OXYGEN SATURATION: 97 %

## 2020-10-16 DIAGNOSIS — F02.80 ALZHEIMER'S DISEASE, UNSPECIFIED: ICD-10-CM

## 2020-10-16 DIAGNOSIS — Z87.19 PERSONAL HISTORY OF OTHER DISEASES OF THE DIGESTIVE SYSTEM: ICD-10-CM

## 2020-10-16 DIAGNOSIS — G30.9 ALZHEIMER'S DISEASE, UNSPECIFIED: ICD-10-CM

## 2020-10-16 DIAGNOSIS — Z86.79 PERSONAL HISTORY OF OTHER DISEASES OF THE CIRCULATORY SYSTEM: ICD-10-CM

## 2020-10-16 DIAGNOSIS — Z87.891 PERSONAL HISTORY OF NICOTINE DEPENDENCE: ICD-10-CM

## 2020-10-16 DIAGNOSIS — I25.10 ATHEROSCLEROTIC HEART DISEASE OF NATIVE CORONARY ARTERY W/OUT ANGINA PECTORIS: ICD-10-CM

## 2020-10-16 DIAGNOSIS — Z86.718 PERSONAL HISTORY OF OTHER VENOUS THROMBOSIS AND EMBOLISM: ICD-10-CM

## 2020-10-16 DIAGNOSIS — F41.9 ANXIETY DISORDER, UNSPECIFIED: ICD-10-CM

## 2020-10-16 DIAGNOSIS — Z85.46 PERSONAL HISTORY OF MALIGNANT NEOPLASM OF PROSTATE: ICD-10-CM

## 2020-10-16 DIAGNOSIS — Z78.9 OTHER SPECIFIED HEALTH STATUS: ICD-10-CM

## 2020-10-16 DIAGNOSIS — I48.0 PAROXYSMAL ATRIAL FIBRILLATION: ICD-10-CM

## 2020-10-16 DIAGNOSIS — F03.90 UNSPECIFIED DEMENTIA W/OUT BEHAVIORAL DISTURBANCE: ICD-10-CM

## 2020-10-16 PROCEDURE — 99204 OFFICE O/P NEW MOD 45 MIN: CPT

## 2020-10-16 NOTE — REVIEW OF SYSTEMS
[Hearing Loss] : hearing loss [Memory Loss] : memory loss [Unsteady Walk] : ataxia [Negative] : Respiratory [FreeTextEntry7] : incontinent [FreeTextEntry8] : incontinent

## 2020-10-16 NOTE — PHYSICAL EXAM
[Normal Oropharynx] : the oropharynx was normal [Normal Outer Ear/Nose] : the outer ears and nose were normal in appearance [Normal] : no respiratory distress, lungs were clear to auscultation bilaterally and no accessory muscle use [No Edema] : there was no peripheral edema [Non Tender] : non-tender [Soft] : abdomen soft [de-identified] : poor insight,

## 2020-10-16 NOTE — PLAN
[FreeTextEntry1] : Reviewed his history in detail with his daughter.  He has significant stiffness as well and there is concern for underlying Parkinsons but this could just be all related to his Parkinsons and lack of activity.\par - Plan to cotniue current emdications including Eliquis for his afib - we reviewed his risk of fall and it appears to be low at this time.\par - Has excellent care at home.  \par - plan follow up in office in 6 months or sooner if

## 2020-10-19 ENCOUNTER — TRANSCRIPTION ENCOUNTER (OUTPATIENT)
Age: 85
End: 2020-10-19

## 2020-12-31 PROBLEM — G30.9 ALZHEIMER'S DEMENTIA: Status: ACTIVE | Noted: 2020-10-16

## 2021-01-14 NOTE — PROGRESS NOTE ADULT - PROBLEM SELECTOR PROBLEM 1
Unresponsiveness
Unresponsiveness
EUA limited 2/2 body habitus. Anteverted uterus. Hysteroscopy with grossly normal, atrophic appearing uterine cavity. Bilateral ostia wnl.

## 2021-01-15 ENCOUNTER — TRANSCRIPTION ENCOUNTER (OUTPATIENT)
Age: 86
End: 2021-01-15

## 2021-02-23 NOTE — PATIENT PROFILE ADULT - HAVE YOU EXPERIENCED A TRAUMATIC EVENT?
Called and spoke to the patient letting him know we have his 6month followup scheduled with Alfreda Main, pt is aware
no

## 2021-03-15 NOTE — PROGRESS NOTE BEHAVIORAL HEALTH - NSBHPTASSESSDT_PSY_A_CORE
This document is complete and the patient is ready for discharge.
29-Nov-2018 12:30
28-Nov-2018 12:00

## 2021-05-03 ENCOUNTER — TRANSCRIPTION ENCOUNTER (OUTPATIENT)
Age: 86
End: 2021-05-03

## 2021-05-14 ENCOUNTER — TRANSCRIPTION ENCOUNTER (OUTPATIENT)
Age: 86
End: 2021-05-14

## 2021-05-20 NOTE — ED PROVIDER NOTE - NO ACUTE FRACTURE/DISLOCATION
[facility] Office Visit Note  21     Tha Mcgovern 80 y o  female MRN: [id number]  : 1940    Assessment:      Diagnoses and all orders for this visit:    COVID-19    Medicare annual wellness visit, subsequent    Iron deficiency anemia due to chronic blood loss  -     ferrous sulfate 324 (65 Fe) mg; Take 1 tablet (324 mg total) by mouth 2 (two) times a day before meals  -     Cancel: CBC and differential; Future  -     Ferritin; Future  -     CBC and differential; Future    Type 2 diabetes mellitus without complication, without long-term current use of insulin (HCC)  -     Hemoglobin A1C; Future  -     Comprehensive metabolic panel; Future  -     Microalbumin / creatinine urine ratio    Other specified hypothyroidism  -     TSH, 3rd generation; Future  -     TSH, 3rd generation with Free T4 reflex; Future    Celiac disease  -     Cancel: CBC and differential; Future  -     Comprehensive metabolic panel; Future    Obstructive sleep apnea    Allergic rhinitis due to pollen, unspecified seasonality    Chronic atrial fibrillation (HCC)    Abdominal aortic aneurysm (AAA) without rupture ([address] )    H/O mitral valve replacement with mechanical valve    Long term (current) use of anticoagulants    Presence of pessary          Discussion Summary and Plan: Today's care plan and medications were reviewed with patient in detail and all their questions answered to their satisfaction      Patient is a 49-year-old pleasant female, who presented to the clinic today for regular follow-up pH she has a past medical history pertinent for hypertension, chronic atrial fibrillation on anticoagulation, hypothyroidism, CAD, type 2 diabetes without long-term use of insulin, microscopic hematuria, history of mitral valve replacement, recent history of COVID-19 infection-not requiring hospitalization who presented today for regular follow-up, she is complaining of post COVID-19 fatigue PA she denies chest pain, shortness of breath, abdominal pain, any GI/ symptoms, headache, visual disturbances  She stated that she is having easy fatigability and generalized weakness  However she stated that she is going to start walking again to build up her endurance  He denied fever, chills, cough or recent upper respiratory tract infection symptoms  Since she has a mitral of and chronic history of anemia I am going to recheck her CBC, CMP TSH and ferritin levels          Chief Complaint   Patient presents with    Medicare Wellness Visit    Arthritis    COVID-19    Follow-up     Anemia, CHF, mitral regurgitation, and      Subjective:  Patient is here for follow-up of COVID 19  The 1st day of symptoms 03/27/2021:  The  Home patient contacted health call nurse on 04/03/2021 and opted not to go for evaluation  Patient contacted us on 04/05/2021 and refused to go for testing  Finally patient went for the testing on 04/06/2021  We contacted patient's family when we got the report on 04/07/2021 recommended monoclonal antibody they refuse patient has all the symptoms were much better which preliminary included some headache is smell loss of smell and and symptoms were only for few days  Patient opted no monoclonal antibody he  Patient is here for follow-up  Chronic disease management as underneath  Anemia, a stable   Most likely it is combination of factor including patient being on 1  Anticoagulation with warfarin 2  Mechanical wall probably destroying some of the red blood cell 3  Had micro hematuria 4  His celiac disease suspected on biopsy 5  Possible bleed from vaginal or pessary 6  Probable to poor intake cannot be ruled out    Celiac disease:  Symptom-free  Hypertension:  Stable continue same regimen  Hyperlipidemia how continue to follow lipid profile and ALT periodically  Atrial fibrillation on chronic anticoagulation by cardiologist  Vitamin D deficiency continue supplement  The protein urea or will monitor periodically  INR being followed by cardiologist   Iron deficiency anemia with negative workup for obvious GI loss now on iron supplement with improving hemoglobin  Abdominal aortic aneurysm, hepatic steatosis, hepatomegaly all stable  Macular degeneration be being followed by ophthalmologist                   The following portions of the patient's history were reviewed and updated as appropriate: allergies, current medications, past family history, past medical history, past social history, past surgical history and problem list     Review of Systems   Constitutional: Positive for activity change, chills and fatigue  Negative for fever and unexpected weight change  HENT: Positive for postnasal drip  Eyes: Negative for visual disturbance  Respiratory: Negative for cough, chest tightness, shortness of breath and wheezing  Cardiovascular: Negative for chest pain, palpitations and leg swelling  Gastrointestinal: Negative for abdominal pain, constipation, diarrhea, nausea and vomiting  Genitourinary: Negative for dysuria, hematuria and urgency  Musculoskeletal: Positive for arthralgias  Neurological: Negative for dizziness, weakness, light-headedness, numbness and headaches  Psychiatric/Behavioral: Negative for agitation and confusion           Historical Information   Patient Active Problem List   Diagnosis    Obstructive sleep apnea    Chronic atrial fibrillation ([address] )    H/O mitral valve replacement with mechanical valve    Long term (current) use of anticoagulants    Presence of prosthetic heart valve    Hypercholesteremia    Anemia due to chronic kidney disease    Allergic rhinitis    Type 2 diabetes mellitus without complication, without long-term current use of insulin (HCC)    Iron deficiency anemia    Other specified hypothyroidism    Vitamin B12 deficiency    Other microscopic hematuria    Presence of pessary    Celiac disease    Abdominal aortic aneurysm (AAA) without rupture [facility] )    Headache    Ataxia    COVID-19    Pulmonary emphysema (HCC)     Past Medical History:   Diagnosis Date    Atrial fibrillation (HCC)     Cancer (HCC)     hx of cervical    Cardiac disease     Hyperlipidemia      Past Surgical History:   Procedure Laterality Date    EYE SURGERY      HYSTERECTOMY      MITRAL VALVE REPLACEMENT       Social History     Substance and Sexual Activity   Alcohol Use Yes    Comment: special occasional     Social History     Substance and Sexual Activity   Drug Use No     Social History     Tobacco Use   Smoking Status Former Smoker    Packs/day: 2 00    Years: 30 00    Pack years: 60 00    Quit date:     Years since quittin 4   Smokeless Tobacco Never Used     Family History   Problem Relation Age of Onset    Heart failure Mother     Heart attack Father     Sleep apnea Brother      Health Maintenance Due   Topic    Medicare Annual Wellness Visit (AWV)     COVID-19 Vaccine (1)    DTaP,Tdap,and Td Vaccines (1 - Tdap)    Pneumococcal Vaccine: 65+ Years (1 of 1 - PPSV23)    HEMOGLOBIN A1C       Meds/Allergies       Current Outpatient Medications:     acetaminophen (TYLENOL) 500 mg tablet, Take 500 mg by mouth, Disp: , Rfl:     Calcium Citrate-Vitamin D (CALCITRATE/VITAMIN D PO), Take by mouth 3 (three) times a week, Disp: , Rfl:     Cholecalciferol (VITAMIN D PO), Take by mouth, Disp: , Rfl:     ferrous sulfate 324 (65 Fe) mg, Take 1 tablet (324 mg total) by mouth 2 (two) times a day before meals, Disp: 180 tablet, Rfl: 1    metFORMIN (GLUCOPHAGE) 500 mg tablet, 2 tablets Twice Daily, Disp: 360 tablet, Rfl: 1    Multiple Vitamins-Minerals (PRESERVISION AREDS PO), Take 2 tablets by mouth daily, Disp: , Rfl:     NADOLOL PO, Take 2 5 mg by mouth daily, Disp: , Rfl:     warfarin (COUMADIN) 2 5 mg tablet, Take 2 5 mg by mouth 3 (three) times a week, Disp: , Rfl:     warfarin (COUMADIN) 5 mg tablet, Take 5 mg by mouth 4 (four) times a week, Disp: , Rfl:     digoxin (LANOXIN) 0 125 mg tablet, Take 125 mcg by mouth daily, Disp: , Rfl:       Objective:    Vitals:   /60   Pulse 82   Temp (!) 97 3 °F (36 3 °C)   Ht 5' 6" (1 676 m)   Wt 75 3 kg (166 lb)   SpO2 97%   BMI 26 79 kg/m²   Body mass index is 26 79 kg/m²  Vitals:    05/20/21 1301   Weight: 75 3 kg (166 lb)       Physical Exam  Vitals signs and nursing note reviewed  Constitutional:       General: She is not in acute distress  Appearance: Normal appearance  She is well-developed  She is not ill-appearing, toxic-appearing or diaphoretic  Comments: Short grey hair   HENT:      Head: Normocephalic and atraumatic  Right Ear: External ear normal       Left Ear: External ear normal    Eyes:      General: No scleral icterus  Conjunctiva/sclera: Conjunctivae normal    Neck:      Musculoskeletal: Normal range of motion  Thyroid: No thyroid mass, thyromegaly or thyroid tenderness  Vascular: Normal carotid pulses  No carotid bruit or JVD  Cardiovascular:      Rate and Rhythm: Normal rate and regular rhythm  Pulses:           Dorsalis pedis pulses are 2+ on the right side and 2+ on the left side  Posterior tibial pulses are 2+ on the right side and 1+ on the left side  Heart sounds: S1 normal  Murmur present  Systolic murmur present with a grade of 2/6  Comments: S2 elevated  Pulmonary:      Effort: No respiratory distress  Breath sounds: Normal breath sounds  No wheezing or rales  Abdominal:      General: Bowel sounds are normal  There is no distension  Palpations: There is no hepatomegaly, mass or pulsatile mass  Tenderness: There is no abdominal tenderness  There is no rebound  Hernia: There is no hernia in the umbilical area, left inguinal area or right inguinal area  Musculoskeletal: Normal range of motion  Right lower leg: No edema  Left lower leg: No edema     Feet:      Right foot:      Skin integrity: No ulcer, skin breakdown, erythema, warmth, callus or dry skin  Left foot:      Skin integrity: No ulcer, skin breakdown, erythema, warmth, callus or dry skin  Lymphadenopathy:      Cervical: No cervical adenopathy  Right cervical: No superficial cervical adenopathy  Skin:     General: Skin is warm  Findings: No rash  Neurological:      General: No focal deficit present  Mental Status: She is alert  Mental status is at baseline  Cranial Nerves: Cranial nerves are intact  Sensory: Sensation is intact  Motor: Motor function is intact  Gait: Tandem walk abnormal  Gait normal    Psychiatric:         Mood and Affect: Mood normal          Behavior: Behavior normal          Thought Content: Thought content normal          Judgment: Judgment normal          Lab Review   Appointment on 05/03/2021   Component Date Value Ref Range Status    Protime 05/03/2021 30 9* 11 6 - 14 5 seconds Final    INR 05/03/2021 3 00* 0 84 - 1 19 Final   Orders Only on 04/19/2021   Component Date Value Ref Range Status    SARS-CoV-2 04/19/2021 Positive* Negative Final   Transcribe Orders on 04/12/2021   Component Date Value Ref Range Status    Protime 04/12/2021 30 8* 11 6 - 14 5 seconds Final    INR 04/12/2021 2 98* 0 84 - 1 19 Final   Orders Only on 04/06/2021   Component Date Value Ref Range Status    SARS-CoV-2 04/06/2021 Positive* Negative Final         Patient Instructions       Medicare Preventive Visit Patient Instructions  Thank you for completing your Welcome to Medicare Visit or Medicare Annual Wellness Visit today  Your next wellness visit will be due in one year (5/21/2022)  The screening/preventive services that you may require over the next 5-10 years are detailed below  Some tests may not apply to you based off risk factors and/or age  Screening tests ordered at today's visit but not completed yet may show as past due   Also, please note that scanned in results may not display below  Preventive Screenings:  Service Recommendations Previous Testing/Comments   Colorectal Cancer Screening  * Colonoscopy    * Fecal Occult Blood Test (FOBT)/Fecal Immunochemical Test (FIT)  * Fecal DNA/Cologuard Test  * Flexible Sigmoidoscopy Age: 54-65 years old   Colonoscopy: every 10 years (may be performed more frequently if at higher risk)  OR  FOBT/FIT: every 1 year  OR  Cologuard: every 3 years  OR  Sigmoidoscopy: every 5 years  Screening may be recommended earlier than age 48 if at higher risk for colorectal cancer  Also, an individualized decision between you and your healthcare provider will decide whether screening between the ages of 74-80 would be appropriate  Colonoscopy: Not on file  FOBT/FIT: 06/15/2020  Cologuard: Not on file  Sigmoidoscopy: Not on file          Breast Cancer Screening Age: 36 years old  Frequency: every 1-2 years  Not required if history of left and right mastectomy Mammogram: 03/12/2020        Cervical Cancer Screening Between the ages of 21-29, pap smear recommended once every 3 years  Between the ages of 33-67, can perform pap smear with HPV co-testing every 5 years  Recommendations may differ for women with a history of total hysterectomy, cervical cancer, or abnormal pap smears in past  Pap Smear: Not on file        Hepatitis C Screening Once for adults born between 1945 and 1965  More frequently in patients at high risk for Hepatitis C Hep C Antibody: 07/05/2019        Diabetes Screening 1-2 times per year if you're at risk for diabetes or have pre-diabetes Fasting glucose: 130 mg/dL   A1C: 6 3 %        Cholesterol Screening Once every 5 years if you don't have a lipid disorder  May order more often based on risk factors  Lipid panel: 01/21/2021          Other Preventive Screenings Covered by Medicare:  1  Abdominal Aortic Aneurysm (AAA) Screening: covered once if your at risk  You're considered to be at risk if you have a family history of AAA    2  Lung Cancer Screening: covers low dose CT scan once per year if you meet all of the following conditions: (1) Age 50-69; (2) No signs or symptoms of lung cancer; (3) Current smoker or have quit smoking within the last 15 years; (4) You have a tobacco smoking history of at least 30 pack years (packs per day multiplied by number of years you smoked); (5) You get a written order from a healthcare provider  3  Glaucoma Screening: covered annually if you're considered high risk: (1) You have diabetes OR (2) Family history of glaucoma OR (3)  aged 48 and older OR (3)  American aged 72 and older  3  Osteoporosis Screening: covered every 2 years if you meet one of the following conditions: (1) You're estrogen deficient and at risk for osteoporosis based off medical history and other findings; (2) Have a vertebral abnormality; (3) On glucocorticoid therapy for more than 3 months; (4) Have primary hyperparathyroidism; (5) On osteoporosis medications and need to assess response to drug therapy  · Last bone density test (DXA Scan): 06/27/2017  5  HIV Screening: covered annually if you're between the age of 12-76  Also covered annually if you are younger than 13 and older than 72 with risk factors for HIV infection  For pregnant patients, it is covered up to 3 times per pregnancy  Immunizations:  Immunization Recommendations   Influenza Vaccine Annual influenza vaccination during flu season is recommended for all persons aged >= 6 months who do not have contraindications   Pneumococcal Vaccine (Prevnar and Pneumovax)  * Prevnar = PCV13  * Pneumovax = PPSV23   Adults 25-60 years old: 1-3 doses may be recommended based on certain risk factors  Adults 72 years old: Prevnar (PCV13) vaccine recommended followed by Pneumovax (PPSV23) vaccine  If already received PPSV23 since turning 65, then PCV13 recommended at least one year after PPSV23 dose     Hepatitis B Vaccine 3 dose series if at intermediate or high risk (ex: diabetes, end stage renal disease, liver disease)   Tetanus (Td) Vaccine - COST NOT COVERED BY MEDICARE PART B Following completion of primary series, a booster dose should be given every 10 years to maintain immunity against tetanus  Td may also be given as tetanus wound prophylaxis  Tdap Vaccine - COST NOT COVERED BY MEDICARE PART B Recommended at least once for all adults  For pregnant patients, recommended with each pregnancy  Shingles Vaccine (Shingrix) - COST NOT COVERED BY MEDICARE PART B  2 shot series recommended in those aged 48 and above     Health Maintenance Due:      Topic Date Due    Hepatitis C Screening  Completed     Immunizations Due:      Topic Date Due    COVID-19 Vaccine (1) Never done    DTaP,Tdap,and Td Vaccines (1 - Tdap) Never done    Pneumococcal Vaccine: 65+ Years (1 of 1 - PPSV23) Never done     Advance Directives   What are advance directives? Advance directives are legal documents that state your wishes and plans for medical care  These plans are made ahead of time in case you lose your ability to make decisions for yourself  Advance directives can apply to any medical decision, such as the treatments you want, and if you want to donate organs  What are the types of advance directives? There are many types of advance directives, and each state has rules about how to use them  You may choose a combination of any of the following:  · Living will: This is a written record of the treatment you want  You can also choose which treatments you do not want, which to limit, and which to stop at a certain time  This includes surgery, medicine, IV fluid, and tube feedings  · Durable power of  for healthcare [place] [facility]): This is a written record that states who you want to make healthcare choices for you when you are unable to make them for yourself  This person, called a proxy, is usually a family member or a friend  You may choose more than 1 proxy    · Do not resuscitate (DNR) order:  A DNR order is used in case your heart stops beating or you stop breathing  It is a request not to have certain forms of treatment, such as CPR  A DNR order may be included in other types of advance directives  · Medical directive: This covers the care that you want if you are in a coma, near death, or unable to make decisions for yourself  You can list the treatments you want for each condition  Treatment may include pain medicine, surgery, blood transfusions, dialysis, IV or tube feedings, and a ventilator (breathing machine)  · Values history: This document has questions about your views, beliefs, and how you feel and think about life  This information can help others choose the care that you would choose  Why are advance directives important? An advance directive helps you control your care  Although spoken wishes may be used, it is better to have your wishes written down  Spoken wishes can be misunderstood, or not followed  Treatments may be given even if you do not want them  An advance directive may make it easier for your family to make difficult choices about your care  Weight Management   Why it is important to manage your weight:  Being overweight increases your risk of health conditions such as heart disease, high blood pressure, type 2 diabetes, and certain types of cancer  It can also increase your risk for osteoarthritis, sleep apnea, and other respiratory problems  Aim for a slow, steady weight loss  Even a small amount of weight loss can lower your risk of health problems  How to lose weight safely:  A safe and healthy way to lose weight is to eat fewer calories and get regular exercise  You can lose up about 1 pound a week by decreasing the number of calories you eat by 500 calories each day  Healthy meal plan for weight management:  A healthy meal plan includes a variety of foods, contains fewer calories, and helps you stay healthy   A healthy meal plan includes the following:  · Eat whole-grain foods more often  A healthy meal plan should contain fiber  Fiber is the part of grains, fruits, and vegetables that is not broken down by your body  Whole-grain foods are healthy and provide extra fiber in your diet  Some examples of whole-grain foods are whole-wheat breads and pastas, oatmeal, brown rice, and bulgur  · Eat a variety of vegetables every day  Include dark, leafy greens such as spinach, kale, reina greens, and mustard greens  Eat yellow and orange vegetables such as carrots, sweet potatoes, and winter squash  · Eat a variety of fruits every day  Choose fresh or canned fruit (canned in its own juice or light syrup) instead of juice  Fruit juice has very little or no fiber  · Eat low-fat dairy foods  Drink fat-free (skim) milk or 1% milk  Eat fat-free yogurt and low-fat cottage cheese  Try low-fat cheeses such as mozzarella and other reduced-fat cheeses  · Choose meat and other protein foods that are low in fat  Choose beans or other legumes such as split peas or lentils  Choose fish, skinless poultry (chicken or turkey), or lean cuts of red meat (beef or pork)  Before you cook meat or poultry, cut off any visible fat  · Use less fat and oil  Try baking foods instead of frying them  Add less fat, such as margarine, sour cream, regular salad dressing and mayonnaise to foods  Eat fewer high-fat foods  Some examples of high-fat foods include french fries, doughnuts, ice cream, and cakes  · Eat fewer sweets  Limit foods and drinks that are high in sugar  This includes candy, cookies, regular soda, and sweetened drinks  Exercise:  Exercise at least 30 minutes per day on most days of the week  Some examples of exercise include walking, biking, dancing, and swimming  You can also fit in more physical activity by taking the stairs instead of the elevator or parking farther away from stores  Ask your healthcare provider about the best exercise plan for you  © Copyright [employer] 2018 Information is for End User's use only and may not be sold, redistributed or otherwise used for commercial purposes  All illustrations and images included in CareNotes® are the copyrighted property of A [employer]  or Kathie Russo  Follow with Consultants as per their and our suggestion    Follow up in 12 week(s) or as needed earlier    Follow all instructions as advised and discussed  Take your medications as prescribed  Call the office immediately if you experience any side effects  Ask questions if you do not understand  Keep your scheduled appointment as advised or come sooner if necessary or in doubt  Best time to call for non-urgent matter or questions on weekdays is between 9am and 12 noon  See physician for any new symptoms or worsening of current symptoms  Urgent or emergent situations call 911 and report to nearest emergency room  I spent  30 -40 minutes taking care of this patient including clinical care, conseling, collaboration, chart, lab and consultaion review as appropriate    Patient is to get labs 1 week(s) prior to next visit if advised               Dr Camilo Sears MD  [facility]       "This note has been constructed using a voice recognition system  Therefore there may be syntax, spelling, and/or grammatical errors   Please call if you have any questions  " wrist

## 2021-10-20 ENCOUNTER — TRANSCRIPTION ENCOUNTER (OUTPATIENT)
Age: 86
End: 2021-10-20

## 2023-02-28 NOTE — PHYSICAL THERAPY INITIAL EVALUATION ADULT - BED MOBILITY LIMITATIONS, REHAB EVAL
0 Hour(s) 28 Minute(s) impaired ability to control trunk for mobility/decreased ability to use arms for pushing/pulling/decreased ability to use legs for bridging/pushing

## 2023-08-02 NOTE — PROGRESS NOTE ADULT - PROBLEM SELECTOR PLAN 2
Multifactorial in the setting of acute and chronic medical conditions   Patient with known history of cerebral palsy  Noted UE/LE contractures and rigidity  Bed bound   Continue restorative services   Continue to implement fall and safety precaution   Continue with pressure offloading and assist  Encourage adequate oral hydration and nutrition, continue with daily multivitamins and supplements   Continue with 24-7 supportive Trop 0.02, CK 3666  Received IV hydration 3L NS in ED   -4VCAD in 2010 per Dr. Espitia (PCP)  -continue aspirin 81 mg and atorvastatin 40 mg daily

## 2024-02-29 NOTE — H&P ADULT - PROBLEM/PLAN-2
Post-Discharge Transitional Care  Follow Up      Erica Perez   YOB: 1949    Date of Office Visit:  2/29/2024  Date of Hospital Admission: 2/20/24  Date of Hospital Discharge: 2/22/24  Risk of hospital readmission (high >=14%. Medium >=10%) :Readmission Risk Score: 10.8      Care management risk score Rising risk (score 2-5) and Complex Care (Scores >=6): No Risk Score On File     Non face to face  following discharge, date last encounter closed (first attempt may have been earlier): 02/23/2024    Call initiated 2 business days of discharge: Yes    ASSESSMENT/PLAN:   Pneumonia due to infectious organism, unspecified laterality, unspecified part of lung  Hospital discharge follow-up  -     [facility] DISCHARGE MEDS RECONCILED W/ CURRENT OUTPATIENT MED LIST  Improving, f/u with Dr. Spears as scheduled.   Side effects, adverse effects of the medication prescribed today, as well as treatment plan/ rationale and result expectations have been discussed with the patient who expresses understanding and desires to proceed.    Close follow up to evaluate treatment results and for coordination of care.  I have reviewed the patient's medical history in detail and updated the computerized patient record.    As always, patient is advised that if symptoms worsen in any way they will proceed to the nearest emergency room.       Medical Decision Making: high complexity  No follow-ups on file.           Subjective:   HPI:  Follow up of Hospital problems/diagnosis(es): Pneumonia    Inpatient course: Discharge summary reviewed- see chart.    Hospital Stay  Narrative of Hospital Course:  Patient comes in for chest pain which resolved.  Questionable pneumonia, CT scan did not show any pneumonia.  Patient improved with antibiotics.  Was likely upper respiratory infection and will discharge on Levaquin to finish the course.    Interval history/Current status:     Feeling well now.  Completed levaquin.  Denies any chest pains  DISPLAY PLAN FREE TEXT

## 2024-06-27 NOTE — END OF VISIT
Contacted [facility], Spoke with Niranjan . She provided me nurse station direct number of [phone] in the event my call gets dropped again. Call was dropped.     Spoke with Nurse Ramsey, he confirms labs will be sent in the folder/packet for today's appointment.   [Time Spent: ___ minutes] : I have spent [unfilled] minutes of time on the encounter. [>50% of the face to face encounter time was spent on counseling and/or coordination of care for ___] : Greater than 50% of the face to face encounter time was spent on counseling and/or coordination of care for [unfilled]

## 2025-02-26 NOTE — DISCHARGE NOTE NURSING/CASE MANAGEMENT/SOCIAL WORK - NSTOBACCONEVERSMOKERY/N_GEN_A
Called regarding Structural heart referral. Spoke with patient & spouse. He is currently in rehab. Unsure of tentative discharge date. Reported he has an appt. With his cardiologist on 2-28-25. Will follow up after appointment.  
Yes